# Patient Record
Sex: FEMALE | Race: WHITE | NOT HISPANIC OR LATINO | Employment: OTHER | URBAN - METROPOLITAN AREA
[De-identification: names, ages, dates, MRNs, and addresses within clinical notes are randomized per-mention and may not be internally consistent; named-entity substitution may affect disease eponyms.]

---

## 2019-05-01 ENCOUNTER — HOSPITAL ENCOUNTER (OUTPATIENT)
Dept: ORTHOPEDIC SURGERY | Facility: CLINIC | Age: 69
Discharge: HOME | End: 2019-05-01
Attending: ORTHOPAEDIC SURGERY | Admitting: ORTHOPAEDIC SURGERY

## 2019-09-19 ENCOUNTER — OFFICE VISIT (OUTPATIENT)
Dept: ORTHOPEDIC SURGERY | Facility: CLINIC | Age: 69
End: 2019-09-19

## 2019-09-19 VITALS
DIASTOLIC BLOOD PRESSURE: 73 MMHG | WEIGHT: 284 LBS | SYSTOLIC BLOOD PRESSURE: 140 MMHG | HEART RATE: 96 BPM | BODY MASS INDEX: 48.49 KG/M2 | HEIGHT: 64 IN

## 2019-09-19 DIAGNOSIS — M17.11 PRIMARY LOCALIZED OSTEOARTHRITIS OF RIGHT KNEE: Primary | ICD-10-CM

## 2019-09-19 PROCEDURE — 99213 OFFICE O/P EST LOW 20 MIN: CPT | Performed by: ORTHOPAEDIC SURGERY

## 2019-09-19 PROCEDURE — 20610 DRAIN/INJ JOINT/BURSA W/O US: CPT | Performed by: ORTHOPAEDIC SURGERY

## 2019-09-19 RX ORDER — DULOXETIN HYDROCHLORIDE 60 MG/1
CAPSULE, DELAYED RELEASE ORAL
COMMUNITY
Start: 2019-08-09

## 2019-09-19 RX ORDER — ATORVASTATIN CALCIUM 20 MG/1
20 TABLET, FILM COATED ORAL DAILY
Refills: 0 | COMMUNITY
Start: 2019-06-29

## 2019-09-19 RX ORDER — TRIAMCINOLONE ACETONIDE 40 MG/ML
80 INJECTION, SUSPENSION INTRA-ARTICULAR; INTRAMUSCULAR ONCE
Status: COMPLETED | OUTPATIENT
Start: 2019-09-19 | End: 2019-09-19

## 2019-09-19 RX ORDER — CETIRIZINE HYDROCHLORIDE 10 MG/1
10 TABLET ORAL
COMMUNITY
Start: 2016-04-18

## 2019-09-19 RX ORDER — MELOXICAM 15 MG/1
15 TABLET ORAL DAILY
Refills: 0 | COMMUNITY
Start: 2019-07-22 | End: 2020-09-24

## 2019-09-19 RX ORDER — BENZONATATE 100 MG/1
100 CAPSULE ORAL
COMMUNITY
Start: 2019-08-29 | End: 2020-09-24

## 2019-09-19 RX ORDER — ALBUTEROL SULFATE 90 UG/1
2 AEROSOL, METERED RESPIRATORY (INHALATION)
COMMUNITY
Start: 2019-08-29

## 2019-09-19 RX ORDER — FLUTICASONE PROPIONATE 50 MCG
SPRAY, SUSPENSION (ML) NASAL
COMMUNITY
Start: 2017-11-12

## 2019-09-19 RX ORDER — CELECOXIB 200 MG/1
CAPSULE ORAL
COMMUNITY
Start: 2019-05-01

## 2019-09-19 RX ORDER — HYDROCODONE BITARTRATE AND ACETAMINOPHEN 5; 325 MG/1; MG/1
TABLET ORAL
COMMUNITY
Start: 2017-03-20

## 2019-09-19 RX ORDER — LISINOPRIL AND HYDROCHLOROTHIAZIDE 25; 20 MG/1; MG/1
1 TABLET ORAL DAILY
COMMUNITY
Start: 2018-07-06

## 2019-09-19 RX ORDER — GABAPENTIN 300 MG/1
CAPSULE ORAL 3 TIMES DAILY
Refills: 5 | COMMUNITY
Start: 2019-09-17

## 2019-09-19 RX ORDER — AMITRIPTYLINE HYDROCHLORIDE 25 MG/1
TABLET, FILM COATED ORAL
COMMUNITY
Start: 2019-05-01 | End: 2021-11-18

## 2019-09-19 RX ADMIN — TRIAMCINOLONE ACETONIDE 80 MG: 40 INJECTION, SUSPENSION INTRA-ARTICULAR; INTRAMUSCULAR at 08:42

## 2019-09-19 NOTE — PROGRESS NOTES
"     Patient ID: Desire Short is a 68 y.o. female.  Bilateral knee pain  Had good results with cortisone injection in the left knee in May.  Pain is returned to that knee and the right knee is also sore.  It is sharp and a 7/10    Review of Systems:  Bilateral knee pain  Denies chest pain      Objective:    /73   Pulse 96   Ht 162.6 cm (64\")   Wt 129 kg (284 lb)   BMI 48.75 kg/m²     Physical Examination:   She is a pleasant female in no distress. She is alert and oriented x3 and appears her stated age.  Both knees demonstrate no scars and no atrophy.  There is medial joint line tenderness with mild effusion.  Range of motion is 5 to 110 degrees bilateral.Sensory and motor exam are intact all distributions. Dorsalis pedis and posterior tibialis pulses are palpable and capillary refill is less than two seconds to all digits      Imaging:   Bilateral knee degenerative joint disease    Assessment:    Bilateral knee degenerative joint disease    Plan:  Treatment options discussed  I recommend injection after today's eval.  Risks and benefits were discussed. Under sterile technique and written consent I injected 40mg of Kenalog and 1cc of 1% Lidocaine plain into each knee. It was well tolerated. Postinjection instructions were given.  "

## 2020-05-13 ENCOUNTER — OFFICE VISIT (OUTPATIENT)
Dept: ORTHOPEDIC SURGERY | Facility: CLINIC | Age: 70
End: 2020-05-13

## 2020-05-13 VITALS
WEIGHT: 291 LBS | HEIGHT: 64 IN | DIASTOLIC BLOOD PRESSURE: 83 MMHG | TEMPERATURE: 98.4 F | HEART RATE: 93 BPM | SYSTOLIC BLOOD PRESSURE: 140 MMHG | BODY MASS INDEX: 49.68 KG/M2

## 2020-05-13 DIAGNOSIS — M17.0 PRIMARY OSTEOARTHRITIS OF BOTH KNEES: Primary | ICD-10-CM

## 2020-05-13 PROCEDURE — 99213 OFFICE O/P EST LOW 20 MIN: CPT | Performed by: ORTHOPAEDIC SURGERY

## 2020-05-13 PROCEDURE — 20610 DRAIN/INJ JOINT/BURSA W/O US: CPT | Performed by: ORTHOPAEDIC SURGERY

## 2020-05-13 RX ORDER — CYCLOBENZAPRINE HCL 10 MG
TABLET ORAL
COMMUNITY
Start: 2020-04-02 | End: 2021-11-18

## 2020-05-13 RX ORDER — CITALOPRAM 40 MG/1
40 TABLET ORAL DAILY
COMMUNITY
Start: 2020-03-18

## 2020-05-13 NOTE — PROGRESS NOTES
"     Patient ID: Desire Short is a 69 y.o. female.  Bilateral knee pain  Desire is a 69-year-old female with bilateral knee pain, she had cortisone injections in September with great results but they have returned in both knees.  Pain is sharp and a 6/10    Review of Systems:  Bilateral knee pain  Denies chest pain      Objective:    /83   Pulse 93   Temp 98.4 °F (36.9 °C) (Oral)   Ht 162.6 cm (64\")   Wt 132 kg (291 lb)   BMI 49.95 kg/m²     Physical Examination:     She is a pleasant female in no distress. She is alert and oriented x3 and appears her stated age.  Both knees demonstrate no scars and no atrophy.  There is medial joint line tenderness with mild effusion.  Range of motion is 5 to 110 degrees bilateral.Sensory and motor exam are intact all distributions. Dorsalis pedis and posterior tibialis pulses are palpable and capillary refill is less than two seconds to all digits    Imaging:       Assessment:    Bilateral knee degenerative joint disease    Plan:  Treatment options discussed and I recommend the right after today's evaluation    Large Joint Arthrocentesis: R knee  Date/Time: 5/13/2020 10:41 AM  Timeout: Immediately prior to procedure a time out was called to verify the correct patient, procedure, equipment, support staff and site/side marked as required   Supporting Documentation  Indications: pain   Procedure Details  Location: knee - R knee  Needle size: 22 G  Medications administered: 32 mg Triamcinolone Acetonide 32 MG  Patient tolerance: patient tolerated the procedure well with no immediate complications    Large Joint Arthrocentesis: L knee  Date/Time: 5/13/2020 10:41 AM  Timeout: Immediately prior to procedure a time out was called to verify the correct patient, procedure, equipment, support staff and site/side marked as required   Supporting Documentation  Indications: pain   Procedure Details  Location: knee - L knee  Needle size: 22 G  Medications administered: 32 mg " Triamcinolone Acetonide 32 MG  Patient tolerance: patient tolerated the procedure well with no immediate complications              Disclaimer: Please note that areas of this note were completed with computer voice recognition software.  Quite often unanticipated grammatical, syntax, homophones, and other interpretive errors are inadvertently transcribed by the computer software. Please excuse any errors that have escaped final proofreading.

## 2020-09-08 ENCOUNTER — OFFICE (OUTPATIENT)
Dept: URBAN - METROPOLITAN AREA CLINIC 64 | Facility: CLINIC | Age: 70
End: 2020-09-08
Payer: COMMERCIAL

## 2020-09-08 VITALS
HEIGHT: 64 IN | HEART RATE: 108 BPM | SYSTOLIC BLOOD PRESSURE: 106 MMHG | WEIGHT: 287 LBS | DIASTOLIC BLOOD PRESSURE: 60 MMHG

## 2020-09-08 DIAGNOSIS — D64.9 ANEMIA, UNSPECIFIED: ICD-10-CM

## 2020-09-08 PROCEDURE — 99203 OFFICE O/P NEW LOW 30 MIN: CPT | Performed by: NURSE PRACTITIONER

## 2020-09-15 ENCOUNTER — TELEPHONE (OUTPATIENT)
Dept: ORTHOPEDIC SURGERY | Facility: CLINIC | Age: 70
End: 2020-09-15

## 2020-09-24 ENCOUNTER — OFFICE VISIT (OUTPATIENT)
Dept: ORTHOPEDIC SURGERY | Facility: CLINIC | Age: 70
End: 2020-09-24

## 2020-09-24 VITALS
WEIGHT: 291 LBS | DIASTOLIC BLOOD PRESSURE: 77 MMHG | BODY MASS INDEX: 49.68 KG/M2 | HEIGHT: 64 IN | SYSTOLIC BLOOD PRESSURE: 121 MMHG | HEART RATE: 114 BPM

## 2020-09-24 DIAGNOSIS — M17.0 PRIMARY OSTEOARTHRITIS OF BOTH KNEES: Primary | ICD-10-CM

## 2020-09-24 PROCEDURE — 20610 DRAIN/INJ JOINT/BURSA W/O US: CPT | Performed by: ORTHOPAEDIC SURGERY

## 2020-09-24 NOTE — PROGRESS NOTES
"     Patient ID: Desire Short is a 69 y.o. female.  Bilateral knee pain  Desire is a 61-year-old female with bilateral knee pain here for Visco 1      Objective:    /77   Pulse 114   Ht 162.6 cm (64\")   Wt 132 kg (291 lb)   BMI 49.95 kg/m²     Physical Examination:  Both knees demonstrate no redness mild effusions      Imaging:      Assessment:  Bilateral knee degenerative joint disease    Plan:  Risks and benefits of the injection were discussed. Under sterile technique and written consent I injected one syringe of Orthovisc into each knee. It was well tolerated. Postinjection instructions were given  "

## 2020-10-01 ENCOUNTER — OFFICE VISIT (OUTPATIENT)
Dept: ORTHOPEDIC SURGERY | Facility: CLINIC | Age: 70
End: 2020-10-01

## 2020-10-01 VITALS
HEART RATE: 112 BPM | WEIGHT: 291 LBS | BODY MASS INDEX: 49.68 KG/M2 | SYSTOLIC BLOOD PRESSURE: 105 MMHG | HEIGHT: 64 IN | DIASTOLIC BLOOD PRESSURE: 76 MMHG

## 2020-10-01 DIAGNOSIS — M17.0 PRIMARY OSTEOARTHRITIS OF BOTH KNEES: Primary | ICD-10-CM

## 2020-10-01 PROCEDURE — 20610 DRAIN/INJ JOINT/BURSA W/O US: CPT | Performed by: ORTHOPAEDIC SURGERY

## 2020-10-01 NOTE — PROGRESS NOTES
Patient ID: Desire Short is a 69 y.o. female.  Bilateral knee pain  Here for Visco 2      Objective:    There were no vitals taken for this visit.    Physical Examination:  Both knees demonstrate no redness trace effusions      Imaging:      Assessment:  Bilateral knee degenerative joint disease    Plan:  Risks and benefits of the injection were discussed. Under sterile technique and written consent I injected one syringe of Orthovisc into each knee. It was well tolerated. Postinjection instructions were given

## 2020-10-08 ENCOUNTER — OFFICE VISIT (OUTPATIENT)
Dept: ORTHOPEDIC SURGERY | Facility: CLINIC | Age: 70
End: 2020-10-08

## 2020-10-08 VITALS
WEIGHT: 291 LBS | SYSTOLIC BLOOD PRESSURE: 135 MMHG | BODY MASS INDEX: 49.68 KG/M2 | HEIGHT: 64 IN | DIASTOLIC BLOOD PRESSURE: 85 MMHG | HEART RATE: 101 BPM

## 2020-10-08 DIAGNOSIS — M17.0 PRIMARY OSTEOARTHRITIS OF BOTH KNEES: Primary | ICD-10-CM

## 2020-10-08 PROCEDURE — 20610 DRAIN/INJ JOINT/BURSA W/O US: CPT | Performed by: ORTHOPAEDIC SURGERY

## 2020-10-08 NOTE — PROGRESS NOTES
"     Patient ID: Desier Short is a 69 y.o. female.    Bilateral knee pain, here for Visco 3  Objective:    /85   Pulse 101   Ht 162.6 cm (64\")   Wt 132 kg (291 lb)   BMI 49.95 kg/m²     Physical Examination:    Both knees demonstrate were no redness trace effusions    Imaging:      Assessment:  Bilateral knee degenerative joint disease    Plan:  Risks and benefits of the injection were discussed. Under sterile technique and written consent I injected one syringe of Orthovisc into each knee. It was well tolerated. Postinjection instructions were given  "

## 2021-03-30 ENCOUNTER — TELEPHONE (OUTPATIENT)
Dept: ORTHOPEDIC SURGERY | Facility: CLINIC | Age: 71
End: 2021-03-30

## 2021-03-30 NOTE — TELEPHONE ENCOUNTER
Caller: DENNISE NICOLAS     Relationship: PATIENT     Best call back number: [phone]    What is the best time to reach you: ANYTIME     What was the call regarding: PT IS REQUESTING TO  SWITCH TO CORTIZONE INJECTIONS-PLEASE CALL PATIENT BACK TO DISCUSS-     Do you require a callback: YES

## 2021-04-01 ENCOUNTER — OFFICE VISIT (OUTPATIENT)
Dept: ORTHOPEDIC SURGERY | Facility: CLINIC | Age: 71
End: 2021-04-01

## 2021-04-01 VITALS
SYSTOLIC BLOOD PRESSURE: 150 MMHG | HEIGHT: 64 IN | WEIGHT: 293 LBS | BODY MASS INDEX: 50.02 KG/M2 | DIASTOLIC BLOOD PRESSURE: 85 MMHG | HEART RATE: 94 BPM

## 2021-04-01 DIAGNOSIS — M17.11 PRIMARY LOCALIZED OSTEOARTHRITIS OF RIGHT KNEE: Primary | ICD-10-CM

## 2021-04-01 PROCEDURE — 20610 DRAIN/INJ JOINT/BURSA W/O US: CPT | Performed by: ORTHOPAEDIC SURGERY

## 2021-04-01 PROCEDURE — 99213 OFFICE O/P EST LOW 20 MIN: CPT | Performed by: ORTHOPAEDIC SURGERY

## 2021-04-01 NOTE — PROGRESS NOTES
"     Patient ID: Desire Short is a 70 y.o. female.  Bilateral knee pain  Desire is a 70-year-old female with bilateral knee pain secondary to degenerative disease.  She had previous viscosupplementation about 6 months ago with really little to no relief.  She had good results with previous steroid injection.  Pain is returned in both knees little bit worse on the right than the left.  She is taken some oral medication used ice without relief, pain is sharp and a 6/10    Review of Systems:  Bilateral knee pain      Objective:    /85   Pulse 94   Ht 162.6 cm (64\")   Wt 134 kg (295 lb)   BMI 50.64 kg/m²     Physical Examination:     Right knee demonstrates intact skin with a mild effusion.  No redness.  She has medial lateral joint line tenderness range of motion is 0-1 and 15 degrees with no instability  Sensory and motor exam are intact all distributions. Dorsalis pedis and posterior tibialis pulses are palpable and capillary refill is less than two seconds to all digits    Imaging:       Assessment:    Bilateral knee degenerative disease    Plan:   Treatment options discussed.  I recommend Zilretta in the right knee after today's evaluation        Large Joint Arthrocentesis: R knee  Date/Time: 4/1/2021 9:22 AM  Consent given by: patient  Timeout: Immediately prior to procedure a time out was called to verify the correct patient, procedure, equipment, support staff and site/side marked as required   Supporting Documentation  Indications: pain   Procedure Details  Location: knee - R knee  Preparation: Patient was prepped and draped in the usual sterile fashion  Needle size: 22 G  Medications administered: 32 mg Triamcinolone Acetonide 32 MG  Patient tolerance: patient tolerated the procedure well with no immediate complications                Disclaimer: Please note that areas of this note were completed with computer voice recognition software.  Quite often unanticipated grammatical, syntax, homophones, " and other interpretive errors are inadvertently transcribed by the computer software. Please excuse any errors that have escaped final proofreading.

## 2021-04-15 ENCOUNTER — OFFICE VISIT (OUTPATIENT)
Dept: ORTHOPEDIC SURGERY | Facility: CLINIC | Age: 71
End: 2021-04-15

## 2021-04-15 VITALS
HEIGHT: 64 IN | WEIGHT: 293 LBS | HEART RATE: 90 BPM | SYSTOLIC BLOOD PRESSURE: 142 MMHG | DIASTOLIC BLOOD PRESSURE: 85 MMHG | BODY MASS INDEX: 50.02 KG/M2

## 2021-04-15 DIAGNOSIS — M17.12 PRIMARY LOCALIZED OSTEOARTHROSIS OF THE KNEE, LEFT: Primary | ICD-10-CM

## 2021-04-15 PROCEDURE — 20610 DRAIN/INJ JOINT/BURSA W/O US: CPT | Performed by: ORTHOPAEDIC SURGERY

## 2021-04-15 NOTE — PROGRESS NOTES
"     Patient ID: Desire Short is a 70 y.o. female.  Left knee pain  Desire is a 70-year-old female with left knee pain secondary to degenerative joint disease.  She had viscosupplementation Zilretta injections last year, the right offer her the best relief      Objective:    /85   Pulse 90   Ht 162.6 cm (64\")   Wt 134 kg (295 lb)   BMI 50.64 kg/m²     Physical Examination:    Left knee demonstrates no redness mild effusion    Imaging:      Assessment:  Left knee degenerative joint disease    Plan:      Large Joint Arthrocentesis: L knee  Date/Time: 4/15/2021 9:19 AM  Consent given by: patient  Timeout: Immediately prior to procedure a time out was called to verify the correct patient, procedure, equipment, support staff and site/side marked as required   Supporting Documentation  Indications: pain   Procedure Details  Location: knee - L knee  Preparation: Patient was prepped and draped in the usual sterile fashion  Needle size: 22 G  Medications administered: 32 mg Triamcinolone Acetonide 32 MG  Patient tolerance: patient tolerated the procedure well with no immediate complications        "

## 2021-11-08 ENCOUNTER — TELEPHONE (OUTPATIENT)
Dept: ORTHOPEDIC SURGERY | Facility: CLINIC | Age: 71
End: 2021-11-08

## 2021-11-08 NOTE — TELEPHONE ENCOUNTER
Caller: Desire Short    Relationship to patient: Self    Best call back number: [phone]    Chief complaint: LEFT KNEE PAIN    Type of visit: INJECTION    Requested date: 11/19/21

## 2021-11-18 ENCOUNTER — OFFICE VISIT (OUTPATIENT)
Dept: ORTHOPEDIC SURGERY | Facility: CLINIC | Age: 71
End: 2021-11-18

## 2021-11-18 VITALS
DIASTOLIC BLOOD PRESSURE: 73 MMHG | HEART RATE: 85 BPM | BODY MASS INDEX: 50.02 KG/M2 | HEIGHT: 64 IN | SYSTOLIC BLOOD PRESSURE: 131 MMHG | WEIGHT: 293 LBS

## 2021-11-18 DIAGNOSIS — M17.12 PRIMARY LOCALIZED OSTEOARTHROSIS OF THE KNEE, LEFT: Primary | ICD-10-CM

## 2021-11-18 PROCEDURE — 20610 DRAIN/INJ JOINT/BURSA W/O US: CPT | Performed by: ORTHOPAEDIC SURGERY

## 2021-11-18 RX ORDER — GABAPENTIN 300 MG/1
900 CAPSULE ORAL
COMMUNITY
Start: 2021-11-05 | End: 2022-02-03

## 2021-11-18 RX ORDER — MELOXICAM 15 MG/1
1 TABLET ORAL DAILY PRN
COMMUNITY
Start: 2021-09-17 | End: 2022-09-17

## 2021-11-18 RX ORDER — HYDROCODONE BITARTRATE AND ACETAMINOPHEN 5; 325 MG/1; MG/1
1 TABLET ORAL
COMMUNITY
Start: 2021-11-13 | End: 2021-12-13

## 2021-11-18 RX ORDER — ROPINIROLE 0.5 MG/1
0.5 TABLET, FILM COATED ORAL 2 TIMES DAILY
COMMUNITY
Start: 2021-11-07 | End: 2023-04-03

## 2021-11-18 RX ORDER — MELOXICAM 15 MG/1
TABLET ORAL
COMMUNITY
Start: 2021-11-16 | End: 2023-04-03

## 2021-11-18 RX ORDER — ROPINIROLE 0.5 MG/1
0.5 TABLET, FILM COATED ORAL
COMMUNITY
Start: 2021-11-05 | End: 2023-04-03

## 2021-11-18 NOTE — PROGRESS NOTES
"     Patient ID: Desire Short is a 71 y.o. female.  Left knee pain  Did well with Zilretta in April but started to wear off this month    Objective:    /73   Pulse 85   Ht 162.6 cm (64\")   Wt 134 kg (295 lb)   BMI 50.64 kg/m²     Physical Examination:    Left knee demonstrates mild effusion no redness    Imaging:      Assessment:  Left knee degenerative joint disease    Plan:  I recommend Zilretta after today's evaluation    Large Joint Arthrocentesis: L knee  Date/Time: 11/18/2021 9:54 AM  Consent given by: patient  Timeout: Immediately prior to procedure a time out was called to verify the correct patient, procedure, equipment, support staff and site/side marked as required   Supporting Documentation  Indications: pain   Procedure Details  Location: knee - L knee  Preparation: Patient was prepped and draped in the usual sterile fashion  Needle size: 22 G  Medications administered: 32 mg Triamcinolone Acetonide 32 MG  Patient tolerance: patient tolerated the procedure well with no immediate complications        "

## 2022-05-13 ENCOUNTER — TELEPHONE (OUTPATIENT)
Dept: ORTHOPEDIC SURGERY | Facility: CLINIC | Age: 72
End: 2022-05-13

## 2022-05-13 NOTE — TELEPHONE ENCOUNTER
Caller: DENNISE     Relationship to patient: SELF    Best call back number: [phone]    Chief complaint: LEFT KNEE    Type of visit: INJECTIONS    Requested date: UNKNOWN

## 2022-05-26 ENCOUNTER — OFFICE VISIT (OUTPATIENT)
Dept: ORTHOPEDIC SURGERY | Facility: CLINIC | Age: 72
End: 2022-05-26

## 2022-05-26 VITALS — HEIGHT: 64 IN | WEIGHT: 293 LBS | BODY MASS INDEX: 50.02 KG/M2 | TEMPERATURE: 98.1 F

## 2022-05-26 DIAGNOSIS — M17.12 PRIMARY LOCALIZED OSTEOARTHROSIS OF THE KNEE, LEFT: Primary | ICD-10-CM

## 2022-05-26 PROCEDURE — 99212 OFFICE O/P EST SF 10 MIN: CPT | Performed by: ORTHOPAEDIC SURGERY

## 2022-05-26 RX ORDER — LANOLIN ALCOHOL/MO/W.PET/CERES
CREAM (GRAM) TOPICAL
COMMUNITY
Start: 2022-05-11

## 2022-05-26 RX ORDER — CYCLOBENZAPRINE HCL 10 MG
TABLET ORAL
COMMUNITY
Start: 2022-04-01

## 2022-05-26 NOTE — PROGRESS NOTES
"     Patient ID: Desire Short is a 71 y.o. female.  Left knee pain  Returns with left knee pain did well with Zilretta twice last year most recently November    Review of Systems:    Left knee pain    Objective:    Temp 98.1 °F (36.7 °C)   Ht 162.6 cm (64\")   Wt 134 kg (295 lb)   BMI 50.64 kg/m²     Physical Examination:     Left knee demonstrates moderate medial joint line pain range of motion 2 to 105 degrees negative Alyssa    Imaging:       Assessment:    Left knee degenerative disease    Plan:   I recommend Zilretta I will see her back when approved      Procedures          Disclaimer: Part of this note may be an electronic transcription/translation of spoken language to printed text using the Dragon Dictation System  "

## 2022-06-22 ENCOUNTER — TELEPHONE (OUTPATIENT)
Dept: ORTHOPEDIC SURGERY | Facility: CLINIC | Age: 72
End: 2022-06-22

## 2022-06-22 NOTE — TELEPHONE ENCOUNTER
Provider: DR. ESCOBEDO    Caller: PATIENT    Relationship to Patient: PATIENT    Phone Number:  [phone]    Reason for Call:  PT. LAST SEEN 05/26/22 FOR LEFT KNEE.   STATES THAT SHE THOUGHT SHE WAS GOING TO BE SET UP JUST FOR A CORTISONE INJECTION- BUT HAS NEVER HEARD BACK ON THIS.  SHE STATES THAT THE CORTISONE INJECTIONS WORK WELL FOR HER.   SHE WOULD PREFER TO GET THIS- NOT GEL INJECTIONS.   PT. ASKING IF MICHELLE OR ASSISTANT WILL CALL HER TO ADVISE.     ALSO- PT. SAID TO LET OFFICE KNOW THAT SHE STILL USES [facility] FOR PRESCRIPTIONS.

## 2022-07-12 ENCOUNTER — OFFICE VISIT (OUTPATIENT)
Dept: ORTHOPEDIC SURGERY | Facility: CLINIC | Age: 72
End: 2022-07-12

## 2022-07-12 VITALS
HEART RATE: 84 BPM | DIASTOLIC BLOOD PRESSURE: 72 MMHG | BODY MASS INDEX: 49.34 KG/M2 | WEIGHT: 289 LBS | HEIGHT: 64 IN | SYSTOLIC BLOOD PRESSURE: 119 MMHG

## 2022-07-12 DIAGNOSIS — M17.12 PRIMARY LOCALIZED OSTEOARTHROSIS OF THE KNEE, LEFT: Primary | ICD-10-CM

## 2022-07-12 PROCEDURE — 20610 DRAIN/INJ JOINT/BURSA W/O US: CPT | Performed by: ORTHOPAEDIC SURGERY

## 2022-07-12 RX ORDER — ROPINIROLE 0.5 MG/1
.5-1 TABLET, FILM COATED ORAL 3 TIMES DAILY
COMMUNITY
Start: 2022-05-23 | End: 2023-04-03

## 2022-07-12 RX ORDER — CYCLOBENZAPRINE HCL 10 MG
10 TABLET ORAL
COMMUNITY
Start: 2022-05-23

## 2022-07-12 RX ORDER — LANOLIN ALCOHOL/MO/W.PET/CERES
325 CREAM (GRAM) TOPICAL DAILY
COMMUNITY
Start: 2022-05-23 | End: 2023-04-03

## 2022-07-12 NOTE — PROGRESS NOTES
"     Patient ID: Desire Short is a 71 y.o. female.    Left knee pain here for Zilretta    Objective:    /72   Pulse 84   Ht 162.6 cm (64\")   Wt 131 kg (289 lb)   BMI 49.61 kg/m²     Physical Examination:    Left knee demonstrates mild effusion no redness    Imaging:      Assessment:  Left knee degenerative joint disease    Plan:  Large Joint Arthrocentesis: L knee  Date/Time: 7/12/2022 9:06 AM  Consent given by: patient  Timeout: Immediately prior to procedure a time out was called to verify the correct patient, procedure, equipment, support staff and site/side marked as required   Supporting Documentation  Indications: pain   Procedure Details  Location: knee - L knee  Preparation: Patient was prepped and draped in the usual sterile fashion  Needle size: 22 G  Medications administered: 32 mg Triamcinolone Acetonide 32 MG  Patient tolerance: patient tolerated the procedure well with no immediate complications        "

## 2022-09-23 ENCOUNTER — HOSPITAL ENCOUNTER (EMERGENCY)
Facility: HOSPITAL | Age: 72
Discharge: HOME | End: 2022-09-24
Attending: EMERGENCY MEDICINE | Admitting: EMERGENCY MEDICINE

## 2022-09-23 DIAGNOSIS — D50.9 IRON DEFICIENCY ANEMIA, UNSPECIFIED IRON DEFICIENCY ANEMIA TYPE: Primary | ICD-10-CM

## 2022-09-23 DIAGNOSIS — R19.5 GUAIAC POSITIVE STOOLS: ICD-10-CM

## 2022-09-23 DIAGNOSIS — K92.1 MELENA: ICD-10-CM

## 2022-09-23 LAB
ABO GROUP BLD: NORMAL
ALBUMIN SERPL-MCNC: 3.9 G/DL (ref 3.5–5.2)
ALBUMIN/GLOB SERPL: 1.3 G/DL
ALP SERPL-CCNC: 129 U/L (ref 39–117)
ALT SERPL W P-5'-P-CCNC: 13 U/L (ref 1–33)
ANION GAP SERPL CALCULATED.3IONS-SCNC: 11 MMOL/L (ref 5–15)
AST SERPL-CCNC: 13 U/L (ref 1–32)
BACTERIA UR QL AUTO: ABNORMAL /HPF
BASOPHILS # BLD AUTO: 0 10*3/MM3 (ref 0–0.2)
BASOPHILS NFR BLD AUTO: 0.5 % (ref 0–1.5)
BILIRUB SERPL-MCNC: 0.6 MG/DL (ref 0–1.2)
BILIRUB UR QL STRIP: NEGATIVE
BLD GP AB SCN SERPL QL: NEGATIVE
BUN SERPL-MCNC: 24 MG/DL (ref 8–23)
BUN/CREAT SERPL: 26.7 (ref 7–25)
CALCIUM SPEC-SCNC: 9.5 MG/DL (ref 8.6–10.5)
CHLORIDE SERPL-SCNC: 101 MMOL/L (ref 98–107)
CLARITY UR: CLEAR
CO2 SERPL-SCNC: 27 MMOL/L (ref 22–29)
COLOR UR: YELLOW
CREAT SERPL-MCNC: 0.9 MG/DL (ref 0.57–1)
D-LACTATE SERPL-SCNC: 0.9 MMOL/L (ref 0.5–2)
DEPRECATED RDW RBC AUTO: 49.4 FL (ref 37–54)
EGFRCR SERPLBLD CKD-EPI 2021: 68.9 ML/MIN/1.73
EOSINOPHIL # BLD AUTO: 0.3 10*3/MM3 (ref 0–0.4)
EOSINOPHIL NFR BLD AUTO: 5.7 % (ref 0.3–6.2)
ERYTHROCYTE [DISTWIDTH] IN BLOOD BY AUTOMATED COUNT: 18.3 % (ref 12.3–15.4)
GLOBULIN UR ELPH-MCNC: 2.9 GM/DL
GLUCOSE SERPL-MCNC: 102 MG/DL (ref 65–99)
GLUCOSE UR STRIP-MCNC: NEGATIVE MG/DL
HCT VFR BLD AUTO: 23.7 % (ref 34–46.6)
HGB BLD-MCNC: 7.3 G/DL (ref 12–15.9)
HGB UR QL STRIP.AUTO: NEGATIVE
HYALINE CASTS UR QL AUTO: ABNORMAL /LPF
INR PPP: 0.98 (ref 0.93–1.1)
KETONES UR QL STRIP: NEGATIVE
LEUKOCYTE ESTERASE UR QL STRIP.AUTO: ABNORMAL
LYMPHOCYTES # BLD AUTO: 1.8 10*3/MM3 (ref 0.7–3.1)
LYMPHOCYTES NFR BLD AUTO: 32.5 % (ref 19.6–45.3)
MCH RBC QN AUTO: 23.8 PG (ref 26.6–33)
MCHC RBC AUTO-ENTMCNC: 31 G/DL (ref 31.5–35.7)
MCV RBC AUTO: 76.8 FL (ref 79–97)
MONOCYTES # BLD AUTO: 1 10*3/MM3 (ref 0.1–0.9)
MONOCYTES NFR BLD AUTO: 17.7 % (ref 5–12)
NEUTROPHILS NFR BLD AUTO: 2.4 10*3/MM3 (ref 1.7–7)
NEUTROPHILS NFR BLD AUTO: 43.6 % (ref 42.7–76)
NITRITE UR QL STRIP: NEGATIVE
NRBC BLD AUTO-RTO: 0 /100 WBC (ref 0–0.2)
PH UR STRIP.AUTO: 6.5 [PH] (ref 5–8)
PLATELET # BLD AUTO: 366 10*3/MM3 (ref 140–450)
PMV BLD AUTO: 6.7 FL (ref 6–12)
POTASSIUM SERPL-SCNC: 4.3 MMOL/L (ref 3.5–5.2)
PROT SERPL-MCNC: 6.8 G/DL (ref 6–8.5)
PROT UR QL STRIP: NEGATIVE
PROTHROMBIN TIME: 10.1 SECONDS (ref 9.6–11.7)
RBC # BLD AUTO: 3.09 10*6/MM3 (ref 3.77–5.28)
RBC # UR STRIP: ABNORMAL /HPF
REF LAB TEST METHOD: ABNORMAL
RETICS # AUTO: 0.06 10*6/MM3 (ref 0.02–0.13)
RETICS/RBC NFR AUTO: 1.89 % (ref 0.7–1.9)
RH BLD: POSITIVE
SODIUM SERPL-SCNC: 139 MMOL/L (ref 136–145)
SP GR UR STRIP: 1.01 (ref 1–1.03)
SQUAMOUS #/AREA URNS HPF: ABNORMAL /HPF
T&S EXPIRATION DATE: NORMAL
UROBILINOGEN UR QL STRIP: ABNORMAL
WBC # UR STRIP: ABNORMAL /HPF
WBC NRBC COR # BLD: 5.5 10*3/MM3 (ref 3.4–10.8)

## 2022-09-23 PROCEDURE — 96375 TX/PRO/DX INJ NEW DRUG ADDON: CPT

## 2022-09-23 PROCEDURE — 86901 BLOOD TYPING SEROLOGIC RH(D): CPT

## 2022-09-23 PROCEDURE — 25010000002 DIPHENHYDRAMINE PER 50 MG: Performed by: EMERGENCY MEDICINE

## 2022-09-23 PROCEDURE — 85025 COMPLETE CBC W/AUTO DIFF WBC: CPT | Performed by: PHYSICIAN ASSISTANT

## 2022-09-23 PROCEDURE — 86900 BLOOD TYPING SEROLOGIC ABO: CPT | Performed by: PHYSICIAN ASSISTANT

## 2022-09-23 PROCEDURE — 85045 AUTOMATED RETICULOCYTE COUNT: CPT | Performed by: PHYSICIAN ASSISTANT

## 2022-09-23 PROCEDURE — 86923 COMPATIBILITY TEST ELECTRIC: CPT

## 2022-09-23 PROCEDURE — 83540 ASSAY OF IRON: CPT | Performed by: PHYSICIAN ASSISTANT

## 2022-09-23 PROCEDURE — 86901 BLOOD TYPING SEROLOGIC RH(D): CPT | Performed by: PHYSICIAN ASSISTANT

## 2022-09-23 PROCEDURE — 99284 EMERGENCY DEPT VISIT MOD MDM: CPT

## 2022-09-23 PROCEDURE — 85610 PROTHROMBIN TIME: CPT | Performed by: PHYSICIAN ASSISTANT

## 2022-09-23 PROCEDURE — 84466 ASSAY OF TRANSFERRIN: CPT | Performed by: PHYSICIAN ASSISTANT

## 2022-09-23 PROCEDURE — 80053 COMPREHEN METABOLIC PANEL: CPT | Performed by: PHYSICIAN ASSISTANT

## 2022-09-23 PROCEDURE — 86850 RBC ANTIBODY SCREEN: CPT | Performed by: PHYSICIAN ASSISTANT

## 2022-09-23 PROCEDURE — 86900 BLOOD TYPING SEROLOGIC ABO: CPT

## 2022-09-23 PROCEDURE — 81001 URINALYSIS AUTO W/SCOPE: CPT | Performed by: PHYSICIAN ASSISTANT

## 2022-09-23 PROCEDURE — 83605 ASSAY OF LACTIC ACID: CPT

## 2022-09-23 PROCEDURE — 96374 THER/PROPH/DIAG INJ IV PUSH: CPT

## 2022-09-23 RX ORDER — ACETAMINOPHEN 500 MG
1000 TABLET ORAL ONCE
Status: COMPLETED | OUTPATIENT
Start: 2022-09-23 | End: 2022-09-23

## 2022-09-23 RX ORDER — PANTOPRAZOLE SODIUM 40 MG/10ML
80 INJECTION, POWDER, LYOPHILIZED, FOR SOLUTION INTRAVENOUS ONCE
Status: COMPLETED | OUTPATIENT
Start: 2022-09-23 | End: 2022-09-23

## 2022-09-23 RX ORDER — SODIUM CHLORIDE 0.9 % (FLUSH) 0.9 %
10 SYRINGE (ML) INJECTION AS NEEDED
Status: DISCONTINUED | OUTPATIENT
Start: 2022-09-23 | End: 2022-09-24 | Stop reason: HOSPADM

## 2022-09-23 RX ORDER — DIPHENHYDRAMINE HYDROCHLORIDE 50 MG/ML
25 INJECTION INTRAMUSCULAR; INTRAVENOUS ONCE
Status: COMPLETED | OUTPATIENT
Start: 2022-09-23 | End: 2022-09-23

## 2022-09-23 RX ADMIN — DIPHENHYDRAMINE HYDROCHLORIDE 25 MG: 50 INJECTION, SOLUTION INTRAMUSCULAR; INTRAVENOUS at 23:51

## 2022-09-23 RX ADMIN — ACETAMINOPHEN 1000 MG: 500 TABLET ORAL at 23:50

## 2022-09-23 RX ADMIN — PANTOPRAZOLE SODIUM 80 MG: 40 INJECTION, POWDER, FOR SOLUTION INTRAVENOUS at 23:05

## 2022-09-23 NOTE — ED PROVIDER NOTES
Subjective       Patient is a 71-year-old female comes in complaining of abnormal lab yesterday on outpatient labs with her primary care doctor on 3-month routine checkup.  Patient states she was told her hemoglobin was 7.4.  Patient reports history of iron deficiency.  Patient states she has felt somewhat more fatigued than usual but states  this is mild for her.  Patient denies any near syncope, loss of consciousness, lightheadedness, chest pain, shortness of breath, nausea, vomiting, diarrhea, abdominal pain or blood or black tarry stools.  Patient states she takes Mobic for chronic pain but denies any other blood thinner.      Review of Systems   Constitutional: Positive for fatigue. Negative for chills and fever.   HENT: Negative for congestion, sore throat, tinnitus and trouble swallowing.    Eyes: Negative for photophobia, discharge and visual disturbance.   Respiratory: Negative for cough, shortness of breath and wheezing.    Cardiovascular: Negative for chest pain, palpitations and leg swelling.   Gastrointestinal: Negative for abdominal pain, blood in stool, diarrhea, nausea and vomiting.   Genitourinary: Negative for dysuria, flank pain and urgency.   Musculoskeletal: Negative for arthralgias and myalgias.   Skin: Negative for rash.   Neurological: Negative for dizziness and headaches.   Psychiatric/Behavioral: Negative for confusion.       Past Medical History:   Diagnosis Date   • Asthma    • Hypertension        No Known Allergies    History reviewed. No pertinent surgical history.    Family History   Problem Relation Age of Onset   • Cancer Father        Social History     Socioeconomic History   • Marital status:    Tobacco Use   • Smoking status: Never Smoker   • Smokeless tobacco: Never Used   Vaping Use   • Vaping Use: Never used   Substance and Sexual Activity   • Alcohol use: No   • Drug use: Defer   • Sexual activity: Defer           Objective   Physical Exam  Vitals and nursing note  reviewed. Exam conducted with a chaperone present (Tamy RUTH janusz).   Constitutional:       General: She is not in acute distress.     Appearance: She is well-developed. She is not diaphoretic.   HENT:      Head: Normocephalic and atraumatic.      Right Ear: External ear normal.      Left Ear: External ear normal.      Nose: Nose normal.      Mouth/Throat:      Pharynx: No oropharyngeal exudate.   Eyes:      Extraocular Movements: Extraocular movements intact.      Conjunctiva/sclera: Conjunctivae normal.      Pupils: Pupils are equal, round, and reactive to light.   Cardiovascular:      Rate and Rhythm: Normal rate and regular rhythm.      Pulses: Normal pulses.      Heart sounds: Normal heart sounds.      Comments: S1, S2 audible.  Pulmonary:      Effort: Pulmonary effort is normal. No respiratory distress.      Breath sounds: Normal breath sounds. No wheezing, rhonchi or rales.      Comments: On room air.  Abdominal:      General: Bowel sounds are normal. There is no distension.      Palpations: Abdomen is soft.      Tenderness: There is no abdominal tenderness. There is no guarding or rebound.   Genitourinary:     Rectum: Normal. Guaiac result positive.      Comments: Tamy boudreaux at bedside during rectal exam for chaperone.  Patient had no gross bright red blood on visual inspection and no erythema of the rectum.  Patient had melanotic stool and he had a positive Hemoccult here.  No bright red blood present.  Musculoskeletal:         General: No tenderness or deformity. Normal range of motion.      Cervical back: Normal range of motion.   Skin:     General: Skin is warm.      Capillary Refill: Capillary refill takes less than 2 seconds.      Findings: No erythema or rash.   Neurological:      General: No focal deficit present.      Mental Status: She is alert and oriented to person, place, and time.      Cranial Nerves: No cranial nerve deficit.      Sensory: No sensory deficit.      Motor: No weakness.  "  Psychiatric:         Mood and Affect: Mood normal.         Behavior: Behavior normal.         Procedures           ED Course  ED Course as of 09/24/22 0552   Sat Sep 24, 2022   0012 Repeat H and H within 3 days [RL]      ED Course User Index  [RL] Francisco Bueno PA      /61 (BP Location: Left arm, Patient Position: Sitting)   Pulse 74   Temp 98.5 °F (36.9 °C) (Oral)   Resp 19   Ht 162.6 cm (64\")   Wt 130 kg (287 lb)   SpO2 98%   BMI 49.26 kg/m²   Labs Reviewed   COMPREHENSIVE METABOLIC PANEL - Abnormal; Notable for the following components:       Result Value    Glucose 102 (*)     BUN 24 (*)     Alkaline Phosphatase 129 (*)     BUN/Creatinine Ratio 26.7 (*)     All other components within normal limits    Narrative:     GFR Normal >60  Chronic Kidney Disease <60  Kidney Failure <15     URINALYSIS W/ CULTURE IF INDICATED - Abnormal; Notable for the following components:    Leuk Esterase, UA Moderate (2+) (*)     All other components within normal limits    Narrative:     In absence of clinical symptoms, the presence of pyuria, bacteria, and/or nitrites on the urinalysis result does not correlate with infection.   CBC WITH AUTO DIFFERENTIAL - Abnormal; Notable for the following components:    RBC 3.09 (*)     Hemoglobin 7.3 (*)     Hematocrit 23.7 (*)     MCV 76.8 (*)     MCH 23.8 (*)     MCHC 31.0 (*)     RDW 18.3 (*)     Monocyte % 17.7 (*)     Monocytes, Absolute 1.00 (*)     All other components within normal limits   URINALYSIS, MICROSCOPIC ONLY - Abnormal; Notable for the following components:    RBC, UA 0-2 (*)     WBC, UA 3-5 (*)     Bacteria, UA Trace (*)     All other components within normal limits   IRON PROFILE - Abnormal; Notable for the following components:    Iron 20 (*)     Iron Saturation 4 (*)     All other components within normal limits   PROTIME-INR - Normal   RETICULOCYTES - Normal   POC LACTATE - Normal   POC LACTATE   TYPE AND SCREEN   PREPARE RBC   CBC AND DIFFERENTIAL    " "Narrative:     The following orders were created for panel order CBC & Differential.  Procedure                               Abnormality         Status                     ---------                               -----------         ------                     CBC Auto Differential[[id number]]        Abnormal            Final result                 Please view results for these tests on the individual orders.     No radiology results for the last day                                       MDM     Chart review:  NKA  EKG: Not indicated    Imaging: Not indicated  Labs: Hemoglobin 7.3 with low MCV and MCH.  CMP largely unremarkable for acute findings.  PT and INR unremarkable.  Patient typed and screened.  UA large unremarkable for acute findings.  Iron profile was ordered and low iron saturation and iron and normal TIBC.  Reticulocytes normal but in the upper range of normal at 1.9.  Initial lactic normal at 0.9.  Vitals:  /61 (BP Location: Left arm, Patient Position: Sitting)   Pulse 74   Temp 98.5 °F (36.9 °C) (Oral)   Resp 19   Ht 162.6 cm (64\")   Wt 130 kg (287 lb)   SpO2 98%   BMI 49.26 kg/m²     Medications given:    Medications   pantoprazole (PROTONIX) injection 80 mg (80 mg Intravenous Given 9/23/22 2305)   acetaminophen (TYLENOL) tablet 1,000 mg (1,000 mg Oral Given 9/23/22 2350)   diphenhydrAMINE (BENADRYL) injection 25 mg (25 mg Intravenous Given 9/23/22 2351)       Procedures:  Not indicated  MDM: Patient is a 71-year-old female comes in complaining of abnormal lab and low hemoglobin.   IV was established. Hemoglobin 7.3 with low MCV and MCH.  CMP largely unremarkable for acute findings.  PT and INR unremarkable.  Patient typed and screened.  UA large unremarkable for acute findings.  Iron profile was ordered and low iron saturation and iron and normal TIBC.  Reticulocytes normal but in the upper range of normal at 1.9.  Initial lactic normal at 0.9.  Patient given 1 unit PRBCs.  Patient was " given Tylenol and Benadryl prior to giving PRBCs.  Patient was given IV Protonix as patient did have positive Hemoccult here in the ED.  Patient instructed to follow-up with GI regarding this.  Patient also instructed to have repeat H&H within 3 days and this was listed on discharge instructions.  Patient family at bedside given strict return precautions and voiced understanding. See full discharge instructions for further details.  Results and plan discussed with patient and is agreeable with plan.  Case discussed with attending provider, Dr. Wilder above.    Final diagnoses:   Iron deficiency anemia, unspecified iron deficiency anemia type   Guaiac positive stools   Melena       ED Disposition  ED Disposition     ED Disposition   Discharge    Condition   Stable    Comment   --             [facility] EMERGENCY DEPARTMENT  [address] [facility] [address] [phone]  Go in 1 day  As needed, If symptoms worsen    Emelina Lopez L, APRN  [address]  [place] [id number] [phone]    Schedule an appointment as soon as possible for a visit in 1 week  For follow-up lab work    Damian Jonas MD  [address] [facility] [place] [address] [phone]    Schedule an appointment as soon as possible for a visit in 1 week  for GI follow up         Medication List      New Prescriptions    pantoprazole 40 MG EC tablet  Commonly known as: PROTONIX  Take 1 tablet by mouth Daily for 14 days.           Where to Get Your Medications      These medications were sent to [id number] - [place], [place] - [address] - [phone]  - [phone] FX  [address] [place] [address]    Phone: [phone]   · pantoprazole 40 MG EC tablet          Francisco Bueno PA  09/24/22 0532       Francisco Bueno PA  09/24/22 0529       Francisco Bueno PA  09/24/22 6562

## 2022-09-24 VITALS
WEIGHT: 287 LBS | HEIGHT: 64 IN | DIASTOLIC BLOOD PRESSURE: 61 MMHG | BODY MASS INDEX: 49 KG/M2 | TEMPERATURE: 98.5 F | SYSTOLIC BLOOD PRESSURE: 128 MMHG | HEART RATE: 74 BPM | RESPIRATION RATE: 19 BRPM | OXYGEN SATURATION: 98 %

## 2022-09-24 LAB
IRON 24H UR-MRATE: 20 MCG/DL (ref 37–145)
IRON SATN MFR SERPL: 4 % (ref 20–50)
TIBC SERPL-MCNC: 533 MCG/DL (ref 298–536)
TRANSFERRIN SERPL-MCNC: 358 MG/DL (ref 200–360)

## 2022-09-24 PROCEDURE — P9016 RBC LEUKOCYTES REDUCED: HCPCS

## 2022-09-24 PROCEDURE — 86900 BLOOD TYPING SEROLOGIC ABO: CPT

## 2022-09-24 PROCEDURE — 36430 TRANSFUSION BLD/BLD COMPNT: CPT

## 2022-09-24 RX ORDER — PANTOPRAZOLE SODIUM 40 MG/1
40 TABLET, DELAYED RELEASE ORAL DAILY
Qty: 14 TABLET | Refills: 0 | Status: SHIPPED | OUTPATIENT
Start: 2022-09-24 | End: 2022-10-08

## 2022-09-24 NOTE — ED NOTES
Pt. Ambulatory to restroom before starting blood, states she can't wait entire transfusion. Blood start time will be off because of this. Scanned at 030 but started at 038.

## 2022-09-24 NOTE — ED NOTES
Friend at bedside. Pt. Aware she is waiting blood transfusion and then will be d'c home to f/u on Monday with PMD

## 2022-09-24 NOTE — ED NOTES
"Pt. States she had routine labs drawn today at her PMD which she gets done approx every 6 months. States that her labs were at 9:30 and got a phone call around 6:00pm stating that she needed to go to ER for low HGB and possible transfusions. Denies any obvious blood loss in urine/stool/emisis. States she normally has a hgb that is 'borderline low\" around 9 she states but takes iron. States stools are typically black b/c of iron but has not noticed any obvious bleeding/blood.  States she has been a little more fatigued than normal and states she thought that was from her fibromyalgia.  "

## 2022-09-24 NOTE — DISCHARGE INSTRUCTIONS
Please come back to the ER if you feel short of breath or feel like you are going to pass out or have severe chest pain you will need reevaluation that time.  Please follow-up with Dr. Jonas as he had microscopic blood in your stool and have history of anemia by giving them a call as soon as possible to set up an appointment in 1 to 2 weeks.  Please follow-up with her primary care provider for lab recheck in 3 days time.  Please continue to take your iron supplement as previously prescribed can take your iron supplement twice daily.  Please do not take NSAIDs, (ie celebrex and mobic) until follow up with Primary care provider.  Please take Protonix as prescribed.

## 2022-09-25 LAB
BH BB BLOOD EXPIRATION DATE: NORMAL
BH BB BLOOD TYPE BARCODE: 6200
BH BB DISPENSE STATUS: NORMAL
BH BB PRODUCT CODE: NORMAL
BH BB UNIT NUMBER: NORMAL
CROSSMATCH INTERPRETATION: NORMAL
UNIT  ABO: NORMAL
UNIT  RH: NORMAL

## 2022-12-05 ENCOUNTER — OFFICE (OUTPATIENT)
Dept: URBAN - METROPOLITAN AREA PATHOLOGY 4 | Facility: PATHOLOGY | Age: 72
End: 2022-12-05
Payer: COMMERCIAL

## 2022-12-05 ENCOUNTER — OFFICE (OUTPATIENT)
Dept: URBAN - METROPOLITAN AREA PATHOLOGY 4 | Facility: PATHOLOGY | Age: 72
End: 2022-12-05
Payer: MEDICARE

## 2022-12-05 ENCOUNTER — ON CAMPUS - OUTPATIENT (OUTPATIENT)
Dept: URBAN - METROPOLITAN AREA HOSPITAL 2 | Facility: HOSPITAL | Age: 72
End: 2022-12-05
Payer: MEDICARE

## 2022-12-05 VITALS
SYSTOLIC BLOOD PRESSURE: 93 MMHG | DIASTOLIC BLOOD PRESSURE: 68 MMHG | OXYGEN SATURATION: 98 % | DIASTOLIC BLOOD PRESSURE: 42 MMHG | DIASTOLIC BLOOD PRESSURE: 66 MMHG | OXYGEN SATURATION: 96 % | WEIGHT: 277 LBS | DIASTOLIC BLOOD PRESSURE: 70 MMHG | RESPIRATION RATE: 19 BRPM | DIASTOLIC BLOOD PRESSURE: 64 MMHG | OXYGEN SATURATION: 99 % | OXYGEN SATURATION: 100 % | SYSTOLIC BLOOD PRESSURE: 95 MMHG | TEMPERATURE: 97.7 F | SYSTOLIC BLOOD PRESSURE: 153 MMHG | HEART RATE: 95 BPM | RESPIRATION RATE: 16 BRPM | HEART RATE: 83 BPM | DIASTOLIC BLOOD PRESSURE: 65 MMHG | SYSTOLIC BLOOD PRESSURE: 127 MMHG | RESPIRATION RATE: 14 BRPM | SYSTOLIC BLOOD PRESSURE: 97 MMHG | HEART RATE: 85 BPM | DIASTOLIC BLOOD PRESSURE: 62 MMHG | HEART RATE: 88 BPM | HEIGHT: 64 IN | HEART RATE: 89 BPM | SYSTOLIC BLOOD PRESSURE: 125 MMHG | SYSTOLIC BLOOD PRESSURE: 116 MMHG | RESPIRATION RATE: 18 BRPM | SYSTOLIC BLOOD PRESSURE: 109 MMHG

## 2022-12-05 DIAGNOSIS — K25.9 GASTRIC ULCER, UNSPECIFIED AS ACUTE OR CHRONIC, WITHOUT HEMO: ICD-10-CM

## 2022-12-05 DIAGNOSIS — K29.50 UNSPECIFIED CHRONIC GASTRITIS WITHOUT BLEEDING: ICD-10-CM

## 2022-12-05 DIAGNOSIS — D12.2 BENIGN NEOPLASM OF ASCENDING COLON: ICD-10-CM

## 2022-12-05 DIAGNOSIS — K44.9 DIAPHRAGMATIC HERNIA WITHOUT OBSTRUCTION OR GANGRENE: ICD-10-CM

## 2022-12-05 DIAGNOSIS — D50.9 IRON DEFICIENCY ANEMIA, UNSPECIFIED: ICD-10-CM

## 2022-12-05 DIAGNOSIS — K57.30 DIVERTICULOSIS OF LARGE INTESTINE WITHOUT PERFORATION OR ABS: ICD-10-CM

## 2022-12-05 PROBLEM — K63.5 POLYP OF COLON: Status: ACTIVE | Noted: 2022-12-05

## 2022-12-05 LAB
GI HISTOLOGY: A. UNSPECIFIED: (no result)
GI HISTOLOGY: B. SELECT: (no result)
GI HISTOLOGY: C. UNSPECIFIED: (no result)
GI HISTOLOGY: PDF REPORT: (no result)

## 2022-12-05 PROCEDURE — 45385 COLONOSCOPY W/LESION REMOVAL: CPT | Performed by: INTERNAL MEDICINE

## 2022-12-05 PROCEDURE — 88305 TISSUE EXAM BY PATHOLOGIST: CPT | Mod: 26 | Performed by: INTERNAL MEDICINE

## 2022-12-05 PROCEDURE — 88342 IMHCHEM/IMCYTCHM 1ST ANTB: CPT | Mod: 26 | Performed by: INTERNAL MEDICINE

## 2022-12-05 PROCEDURE — 43239 EGD BIOPSY SINGLE/MULTIPLE: CPT | Performed by: INTERNAL MEDICINE

## 2022-12-05 NOTE — SERVICEHPINOTES
Had ODALYS req blood tx a few weeks ago, f/u hgb improved.    AZAM VELASQUEZ  is a  72  female   who presents today for a  EGD-Colonoscopy   for   the indications listed below. The updated Patient Profile was reviewed prior to the procedure, in conjunction with the Physical Exam, including medical conditions, surgical procedures, medications, allergies, family history and social history. See Physical Exam time stamp below for date and time of HPI completion.Pre-operatively, I reviewed the indication(s) for the procedure, the risks of the procedure [including but not limited to: unexpected bleeding possibly requiring hospitalization and/or unplanned repeat procedures, perforation possibly requiring surgical treatment, missed lesions and complications of sedation/MAC (also explained by anesthesia staff)]. I have evaluated the patient for risks associated with the planned anesthesia and the procedure to be performed and find the patient an acceptable candidate for IV sedation.Multiple opportunities were provided for any questions or concerns, and all questions were answered satisfactorily before any anesthesia was administered. We will proceed with the planned procedure.br

## 2022-12-05 NOTE — SERVICENOTES
Ozzie's erosions likely cause of pt's ODALYS. Could consider HH repair but comorbidities/obesity would increase her risk.

## 2023-03-13 ENCOUNTER — TRANSCRIBE ORDERS (OUTPATIENT)
Dept: ADMINISTRATIVE | Facility: HOSPITAL | Age: 73
End: 2023-03-13
Payer: MEDICARE

## 2023-03-13 DIAGNOSIS — M54.16 LUMBAR RADICULOPATHY: Primary | ICD-10-CM

## 2023-03-21 ENCOUNTER — HOSPITAL ENCOUNTER (OUTPATIENT)
Dept: MRI IMAGING | Facility: HOSPITAL | Age: 73
Discharge: HOME | End: 2023-03-21
Admitting: NURSE PRACTITIONER
Payer: MEDICARE

## 2023-03-21 DIAGNOSIS — M54.16 LUMBAR RADICULOPATHY: ICD-10-CM

## 2023-03-21 PROCEDURE — 72148 MRI LUMBAR SPINE W/O DYE: CPT

## 2023-04-03 ENCOUNTER — OFFICE VISIT (OUTPATIENT)
Dept: PAIN MEDICINE | Facility: CLINIC | Age: 73
End: 2023-04-03
Payer: MEDICARE

## 2023-04-03 ENCOUNTER — PATIENT ROUNDING (BHMG ONLY) (OUTPATIENT)
Dept: PAIN MEDICINE | Facility: CLINIC | Age: 73
End: 2023-04-03
Payer: MEDICARE

## 2023-04-03 VITALS
HEART RATE: 78 BPM | RESPIRATION RATE: 16 BRPM | OXYGEN SATURATION: 99 % | DIASTOLIC BLOOD PRESSURE: 70 MMHG | SYSTOLIC BLOOD PRESSURE: 140 MMHG

## 2023-04-03 DIAGNOSIS — M51.36 DDD (DEGENERATIVE DISC DISEASE), LUMBAR: ICD-10-CM

## 2023-04-03 DIAGNOSIS — M48.061 SPINAL STENOSIS AT L4-L5 LEVEL: Primary | ICD-10-CM

## 2023-04-03 DIAGNOSIS — M54.16 LUMBAR RADICULOPATHY: ICD-10-CM

## 2023-04-03 PROCEDURE — 99204 OFFICE O/P NEW MOD 45 MIN: CPT | Performed by: STUDENT IN AN ORGANIZED HEALTH CARE EDUCATION/TRAINING PROGRAM

## 2023-04-03 PROCEDURE — 1125F AMNT PAIN NOTED PAIN PRSNT: CPT | Performed by: STUDENT IN AN ORGANIZED HEALTH CARE EDUCATION/TRAINING PROGRAM

## 2023-04-03 RX ORDER — PREGABALIN 150 MG/1
150 CAPSULE ORAL 2 TIMES DAILY
Qty: 30 CAPSULE | Refills: 0 | Status: SHIPPED | OUTPATIENT
Start: 2023-04-03

## 2023-04-03 NOTE — PROGRESS NOTES
CHIEF COMPLAINT  Chief Complaint   Patient presents with   • Back Pain     New patient referred for Lumbar arthropathy Treated w/Hydrocodone 04/02 @ 9 pm        Primary Care  Emelina Lopez APRN Subjective   Desire Short is a 72 y.o. female  who presents for chronic low back pain with bilateral lumbar radiculopathy.  She reports pain in the lower lumbar region which tends to radiate into the hips bilaterally.  She does have some focal tenderness across the SI joints bilaterally as well.  She describes pain especially with prolonged standing and walking which improves with leaning forward and resting.  She tends to have minimal pain with sitting and resting.  She is currently taking hydrocodone from her primary care provider but does not get significant pain relief with the medications.  She recently had an MRI which does show several areas of mild to moderate stenosis which could be contributing to her pain.  She denies any significant red flag symptoms such as loss of bowel or bladder or significant numbness or weakness.    History of Present Illness     Location: Low back with radiation to the hips bilaterally  Onset: Years ago  Duration: Slowly worsening  Timing: Constant throughout the day  Quality: Aching, cramping  Severity: Today: 3       Last Week: 3       Worst: 7  Modifying Factors: The pain is worse with movement and physical activity and standing and slightly improved with sitting  Functional Deficit: The pain makes it difficult for her to perform her activities of daily living and stand for prolonged periods of time    Physical Therapy: no    Interval Update 04/03/2023:     The following portions of the patient's history were reviewed and updated as appropriate: allergies, current medications, past family history, past medical history, past social history, past surgical history and problem list.      Current Outpatient Medications:   •  albuterol sulfate  (90 Base) MCG/ACT inhaler,  Inhale 2 puffs., Disp: , Rfl:   •  atorvastatin (LIPITOR) 20 MG tablet, Take 1 tablet by mouth Daily. 200001, Disp: , Rfl: 0  •  Breo Ellipta 200-25 MCG/INH inhaler, Inhale 1 puff Daily., Disp: , Rfl:   •  celecoxib (CeleBREX) 200 MG capsule, CELEBREX 200 MG CAPS, Disp: , Rfl:   •  cetirizine (zyrTEC) 10 MG tablet, Take 1 tablet by mouth., Disp: , Rfl:   •  Cholecalciferol 1000 units tablet, Take 2 tablets by mouth Daily., Disp: , Rfl:   •  citalopram (CeleXA) 40 MG tablet, Take 1 tablet by mouth Daily. 200001, Disp: , Rfl:   •  cyclobenzaprine (FLEXERIL) 10 MG tablet, TAKE 1 TABLET BY MOUTH THREE TIMES A DAY AS NEEDED FOR MUSCLE SPASM (FLEXERIL), Disp: , Rfl:   •  cyclobenzaprine (FLEXERIL) 10 MG tablet, Take 1 tablet by mouth., Disp: , Rfl:   •  DULoxetine (CYMBALTA) 60 MG capsule, TAKE 1 CAPSULE BY MOUTH ONE TIME A DAY, Disp: , Rfl:   •  ferrous sulfate 325 (65 FE) MG EC tablet, Take 1 tablet by mouth daily with breakfast Take on empty stomach, with something acidic., Disp: , Rfl:   •  fluticasone (FLONASE) 50 MCG/ACT nasal spray, , Disp: , Rfl:   •  gabapentin (NEURONTIN) 300 MG capsule, Take  by mouth 3 (Three) Times a Day., Disp: , Rfl: 5  •  HYDROcodone-acetaminophen (NORCO) 5-325 MG per tablet, , Disp: , Rfl:   •  lisinopril-hydrochlorothiazide (PRINZIDE,ZESTORETIC) 20-25 MG per tablet, Take 1 tablet by mouth Daily., Disp: , Rfl:   •  PROAIR  (90 Base) MCG/ACT inhaler, , Disp: , Rfl:   •  pregabalin (LYRICA) 150 MG capsule, Take 1 capsule by mouth 2 (Two) Times a Day., Disp: 30 capsule, Rfl: 0    Current Facility-Administered Medications:   •  Hyaluronan (ORTHOVISC) injection 30 mg, 30 mg, Intra-articular, Weekly, Kaleb Teague MD, 30 mg at 09/24/20 1057  •  Hyaluronan (ORTHOVISC) injection 30 mg, 30 mg, Intra-articular, Weekly, Kaleb Teague MD, 30 mg at 09/24/20 1057  •  Hyaluronan (ORTHOVISC) injection 30 mg, 30 mg, Intra-articular, Weekly, Kaleb Teague MD, 30 mg at  10/01/20 1052  •  Hyaluronan (ORTHOVISC) injection 30 mg, 30 mg, Intra-articular, Weekly, Kaleb Teague MD, 30 mg at 10/01/20 1051  •  Hyaluronan (ORTHOVISC) injection 30 mg, 30 mg, Intra-articular, Weekly, Kaleb Teague MD, 30 mg at 10/08/20 1106  •  Hyaluronan (ORTHOVISC) injection 30 mg, 30 mg, Intra-articular, Weekly, Kaleb Teague MD, 30 mg at 10/08/20 1105    Review of Systems   Musculoskeletal: Positive for arthralgias, back pain, gait problem and myalgias. Negative for joint swelling.   Neurological: Negative for weakness and numbness.       Vitals:    04/03/23 0906   BP: 140/70   Pulse: 78   Resp: 16   SpO2: 99%   PainSc:   3         Objective   Physical Exam  Vitals and nursing note reviewed.   Constitutional:       Appearance: She is well-developed.   Neck:      Trachea: No tracheal deviation.   Musculoskeletal:      Comments: Lumbar Spine Exam:  Tender to palpation over the lumbar paraspinal musculature No  Limited range of motion secondary to pain No  Facet loading positive: Negative  Facets tender to palpation: Negative  Straight leg raise test positive: Equivocal    SI Joint Exam:  David positive: Negative  PSIS tender: bilateral   SI joint palpation: bilateral  SI joint compression: Negative     Neurological:      General: No focal deficit present.      Mental Status: She is alert.      Sensory: No sensory deficit.      Motor: No weakness.           Assessment & Plan   Problems Addressed this Visit    None  Visit Diagnoses     Spinal stenosis at L4-L5 level    -  Primary    Relevant Medications    pregabalin (LYRICA) 150 MG capsule    Other Relevant Orders    Epidural Block    Lumbar radiculopathy        DDD (degenerative disc disease), lumbar          Diagnoses       Codes Comments    Spinal stenosis at L4-L5 level    -  Primary ICD-10-CM: M48.061  ICD-9-CM: 724.02     Lumbar radiculopathy     ICD-10-CM: M54.16  ICD-9-CM: 724.4     DDD (degenerative disc disease),  lumbar     ICD-10-CM: M51.36  ICD-9-CM: 722.52           Plan:  1. I reviewed her lumbar MRI which does show several areas of mild to moderate stenosis.  Given that she has pain radiating into the hips especially with prolonged standing and walking and improvement with flexion suggests more of a stenotic type presentation  2. We will plan for lumbar epidural steroid injection at L4-5  3. She had previously been on gabapentin.  We will try Lyrica 150 mg twice daily to see if she does somewhat better  4. She does have some SI tenderness on palpation.  May consider SI joints in the future if she does not get significant benefit from LESI   --- Follow-up next available for lumbar epidural steroid injection           INSPECT REPORT    As part of the patient's treatment plan, I may be prescribing controlled substances. The patient has been made aware of appropriate use of such medications, including potential risk of somnolence, limited ability to drive and/or work safely, and the potential for dependence or overdose. It has also bee made clear that these medications are for use by this patient only, without concomitant use of alcohol or other substances unless prescribed.     Patient has completed prescribing agreement detailing terms of continued prescribing of controlled substances, including monitoring AMA reports, urine drug screening, and pill counts if necessary. The patient is aware that inappropriate use will results in cessation of prescribing such medications.    INSPECT report has been reviewed and scanned into the patient's chart.    As the clinician, I personally reviewed the INSPECT from 3/31/2023.    History and physical exam exhibit continued safe and appropriate use of controlled substances.      EMR Dragon/Transcription disclaimer:   Much of this encounter note is an electronic transcription/translation of spoken language to printed text. The electronic translation of spoken language may permit  erroneous, or at times, nonsensical words or phrases to be inadvertently transcribed; Although I have reviewed the note for such errors, some may still exist.

## 2023-04-03 NOTE — PROGRESS NOTES
April 3, 2023    Hello, may I speak with Desire Short?    My name is Lora     I am  with [facility]  [address]  [place] [address].    Before we get started may I verify your date of birth? 1950    I am calling to officially welcome you to our practice and ask about your recent visit. Is this a good time to talk? yes    Tell me about your visit with us. What things went well?  Very satisfied        We're always looking for ways to make our patients' experiences even better. Do you have recommendations on ways we may improve?  no    Overall were you satisfied with your first visit to our practice? yes       I appreciate you taking the time to speak with me today. Is there anything else I can do for you? no      Thank you, and have a great day.

## 2023-04-12 ENCOUNTER — HOSPITAL ENCOUNTER (OUTPATIENT)
Dept: PAIN MEDICINE | Facility: HOSPITAL | Age: 73
Discharge: HOME | End: 2023-04-12
Payer: MEDICARE

## 2023-04-12 VITALS
TEMPERATURE: 98.3 F | HEIGHT: 64 IN | SYSTOLIC BLOOD PRESSURE: 143 MMHG | WEIGHT: 280 LBS | RESPIRATION RATE: 16 BRPM | OXYGEN SATURATION: 100 % | BODY MASS INDEX: 47.8 KG/M2 | DIASTOLIC BLOOD PRESSURE: 77 MMHG | HEART RATE: 68 BPM

## 2023-04-12 DIAGNOSIS — M51.36 DDD (DEGENERATIVE DISC DISEASE), LUMBAR: Primary | ICD-10-CM

## 2023-04-12 DIAGNOSIS — M54.16 LUMBAR RADICULOPATHY: ICD-10-CM

## 2023-04-12 DIAGNOSIS — R52 PAIN: ICD-10-CM

## 2023-04-12 DIAGNOSIS — M48.061 SPINAL STENOSIS AT L4-L5 LEVEL: ICD-10-CM

## 2023-04-12 PROCEDURE — 62323 NJX INTERLAMINAR LMBR/SAC: CPT | Performed by: STUDENT IN AN ORGANIZED HEALTH CARE EDUCATION/TRAINING PROGRAM

## 2023-04-12 PROCEDURE — 25510000001 IOPAMIDOL 41 % SOLUTION: Performed by: STUDENT IN AN ORGANIZED HEALTH CARE EDUCATION/TRAINING PROGRAM

## 2023-04-12 PROCEDURE — 77003 FLUOROGUIDE FOR SPINE INJECT: CPT

## 2023-04-12 PROCEDURE — 25010000002 ROPIVACAINE PER 1 MG: Performed by: STUDENT IN AN ORGANIZED HEALTH CARE EDUCATION/TRAINING PROGRAM

## 2023-04-12 PROCEDURE — 25010000002 METHYLPREDNISOLONE PER 40 MG: Performed by: STUDENT IN AN ORGANIZED HEALTH CARE EDUCATION/TRAINING PROGRAM

## 2023-04-12 RX ORDER — ROPIVACAINE HYDROCHLORIDE 2 MG/ML
5 INJECTION, SOLUTION EPIDURAL; INFILTRATION; PERINEURAL ONCE
Status: COMPLETED | OUTPATIENT
Start: 2023-04-12 | End: 2023-04-12

## 2023-04-12 RX ORDER — METHYLPREDNISOLONE ACETATE 40 MG/ML
40 INJECTION, SUSPENSION INTRA-ARTICULAR; INTRALESIONAL; INTRAMUSCULAR; SOFT TISSUE ONCE
Status: COMPLETED | OUTPATIENT
Start: 2023-04-12 | End: 2023-04-12

## 2023-04-12 RX ADMIN — ROPIVACAINE HYDROCHLORIDE 5 MG: 2 INJECTION EPIDURAL; INFILTRATION; PERINEURAL at 09:23

## 2023-04-12 RX ADMIN — IOPAMIDOL 3 ML: 408 INJECTION, SOLUTION INTRATHECAL at 09:22

## 2023-04-12 RX ADMIN — METHYLPREDNISOLONE ACETATE 40 MG: 40 INJECTION, SUSPENSION INTRA-ARTICULAR; INTRALESIONAL; INTRAMUSCULAR; INTRASYNOVIAL; SOFT TISSUE at 09:23

## 2023-04-12 NOTE — PROCEDURES
Lumbar Epidural Steroid Injection  [facility]    PREOPERATIVE DIAGNOSIS:   Chronic low back pain, Lumbar Degenerative Disc Disease and Lumbar Spinal Stenosis unspecified regarding Neurogenic Claudication  POSTOPERATIVE DIAGNOSIS:  Same as preop diagnosis    PROCEDURE:   Lumbar Epidural Steroid Injection, Therapeutic Translaminar Injection, with epidurogram, at  L3/L4 level    PRE-PROCEDURE DISCUSSION WITH PATIENT:    Risks and complications were discussed with the patient prior to starting the procedure and informed consent was obtained.  We discussed various topics including but not limited to bleeding, infection, injury, paralysis, nerve injury, dural puncture, coma, death, worsening of clinical picture, lack of pain relief, and postprocedural soreness.    SURGEON:  Chris Camarena MD    REASON FOR PROCEDURE:    Degenerative changes are noted in the area., Stenotic area is noted, and is likely contributing to this chronic &/or recurrent pain.  and Radiating pattern of pain is likely consistent with degenerative changes in the area.    SEDATION:  Patient declined administration of moderate sedation    ANESTHETIC:  Ropi 0.2%  STEROID:   Methylprednisolone (DEPO MEDROL) 40mg/ml    DESCRIPTON OF PROCEDURE:    After obtaining informed consent, I.V. was not started in the preop area.   The patient was taken to the operating room and placed in the prone position.  All pressure points were well padded.  The lumbar spine area was prepped with Chloraprep and draped in a sterile fashion.  Under fluoroscopic guidance, the above mentioned interlaminar space was identified. Skin and subcutaneous tissues were anesthetized with 1% lidocaine in the middle of the space. A Tuohy needle was introduced through the skin and advanced to this interlaminar space and into the epidural space under fluoroscopic guidance and verified with loss-of-resistance technique to air.  After confirming the position of the needle with the  fluoroscope with all the views, and after aspiration was confirmed negative for blood and CSF, 1.5 mL of Omnipaque was injected.  After seeing appropriate epidurogram with lateral and PA views, a total of 4 cc solution was injected, consisting of 3cc of local anesthetic as above, with normal saline and injectable steroid as above.     ESTIMATED BLOOD LOSS:  <5 mL  SPECIMENS:  None    COMPLICATIONS:     No complications were noted., There was no indication of vascular uptake on live injection of contrast dye., There was no indication of intrathecal uptake on live injection of contrast dye. and There was not any evidence of dural puncture.      TOLERANCE & DISCHARGE CONDITION:    The patient tolerated the procedure well.  The patient was transported to the recovery area without difficulties.  The patient was discharged to home under the care of family in stable and satisfactory condition.    PLAN OF CARE:  1. The patient was given our standard instruction sheet.  2. The patient will Return to clinic 4-6 wks  3. The patient will resume all medications as per the medication reconciliation sheet.

## 2023-04-12 NOTE — DISCHARGE INSTRUCTIONS

## 2023-04-13 ENCOUNTER — TELEPHONE (OUTPATIENT)
Dept: PAIN MEDICINE | Facility: CLINIC | Age: 73
End: 2023-04-13
Payer: MEDICARE

## 2023-04-13 ENCOUNTER — TELEPHONE (OUTPATIENT)
Dept: PAIN MEDICINE | Facility: HOSPITAL | Age: 73
End: 2023-04-13
Payer: MEDICARE

## 2023-04-13 NOTE — TELEPHONE ENCOUNTER
Called PT and scheduled pt for follow up, PT said she was started on Lyrica and was given a prescription for 2 weeks. She is not out of medication and actually did not even get to start taking until last Friday because of an problem at the pharmacy   . Pt was just wondering if she need to call when she was out or what she should do/. Please advise

## 2023-04-24 DIAGNOSIS — M48.061 SPINAL STENOSIS AT L4-L5 LEVEL: ICD-10-CM

## 2023-04-24 RX ORDER — PREGABALIN 150 MG/1
150 CAPSULE ORAL 2 TIMES DAILY
Qty: 60 CAPSULE | Refills: 1 | Status: SHIPPED | OUTPATIENT
Start: 2023-04-24

## 2023-04-24 NOTE — TELEPHONE ENCOUNTER
Rx Refill Note  Requested Prescriptions     Pending Prescriptions Disp Refills   • pregabalin (LYRICA) 150 MG capsule 30 capsule 0     Sig: Take 1 capsule by mouth 2 (Two) Times a Day.      Last office visit with prescribing clinician: 4/3/2023   Last telemedicine visit with prescribing clinician: 5/22/2023   Next office visit with prescribing clinician: 5/22/2023                         Would you like a call back once the refill request has been completed: [] Yes [] No    If the office needs to give you a call back, can they leave a voicemail: [] Yes [] No    Lora Pyle MA  04/24/23, 08:40 EDT

## 2023-05-16 DIAGNOSIS — M48.061 SPINAL STENOSIS AT L4-L5 LEVEL: ICD-10-CM

## 2023-05-16 RX ORDER — PREGABALIN 150 MG/1
150 CAPSULE ORAL 3 TIMES DAILY
Qty: 909 CAPSULE | Refills: 1 | Status: SHIPPED | OUTPATIENT
Start: 2023-05-16

## 2023-05-22 ENCOUNTER — OFFICE VISIT (OUTPATIENT)
Dept: PAIN MEDICINE | Facility: CLINIC | Age: 73
End: 2023-05-22
Payer: MEDICARE

## 2023-05-22 ENCOUNTER — TELEPHONE (OUTPATIENT)
Dept: ORTHOPEDIC SURGERY | Facility: CLINIC | Age: 73
End: 2023-05-22
Payer: MEDICARE

## 2023-05-22 VITALS
HEART RATE: 74 BPM | DIASTOLIC BLOOD PRESSURE: 39 MMHG | SYSTOLIC BLOOD PRESSURE: 144 MMHG | RESPIRATION RATE: 16 BRPM | OXYGEN SATURATION: 98 %

## 2023-05-22 DIAGNOSIS — M17.12 PRIMARY LOCALIZED OSTEOARTHROSIS OF THE KNEE, LEFT: Primary | ICD-10-CM

## 2023-05-22 DIAGNOSIS — M51.36 DDD (DEGENERATIVE DISC DISEASE), LUMBAR: ICD-10-CM

## 2023-05-22 DIAGNOSIS — M48.061 SPINAL STENOSIS AT L4-L5 LEVEL: ICD-10-CM

## 2023-05-22 DIAGNOSIS — M17.12 PRIMARY LOCALIZED OSTEOARTHRITIS OF LEFT KNEE: Primary | ICD-10-CM

## 2023-05-22 NOTE — PROGRESS NOTES
CHIEF COMPLAINT  Chief Complaint   Patient presents with   • Back Pain     6 wk f/u from Lumbar epidural  CC  Spinal stenosis  Treated w/Hydrocodone 05/21 @ 9 pm        Primary Care  Emelina Lopez APRN Subjective   Desire Short is a 72 y.o. female  who presents for chronic low back pain with bilateral lumbar radiculopathy.  She reports pain in the lower lumbar region which tends to radiate into the hips bilaterally.  She does have some focal tenderness across the SI joints bilaterally as well.  She describes pain especially with prolonged standing and walking which improves with leaning forward and resting.  She tends to have minimal pain with sitting and resting.  She is currently taking hydrocodone from her primary care provider but does not get significant pain relief with the medications.  She recently had an MRI which does show several areas of mild to moderate stenosis which could be contributing to her pain.  She denies any significant red flag symptoms such as loss of bowel or bladder or significant numbness or weakness.    Back Pain  Pertinent negatives include no numbness or weakness.        Location: Low back with radiation to the hips bilaterally  Onset: Years ago  Duration: Slowly worsening  Timing: Constant throughout the day  Quality: Aching, cramping  Severity: Today: 3       Last Week: 3       Worst: 7  Modifying Factors: The pain is worse with movement and physical activity and standing and slightly improved with sitting  Functional Deficit: The pain makes it difficult for her to perform her activities of daily living and stand for prolonged periods of time    Physical Therapy: no    Interval Update 05/22/2023: Getting better benefit with the Lyrica 150 mg 3 times daily.  Is requesting repeat knee injections.  She reports she received injections approximately 1 year ago with Zilretta and got approximately 1 year benefit    The following portions of the patient's history were reviewed and  updated as appropriate: allergies, current medications, past family history, past medical history, past social history, past surgical history and problem list.      Current Outpatient Medications:   •  albuterol sulfate  (90 Base) MCG/ACT inhaler, Inhale 2 puffs., Disp: , Rfl:   •  atorvastatin (LIPITOR) 20 MG tablet, Take 1 tablet by mouth Daily. 200001, Disp: , Rfl: 0  •  Breo Ellipta 200-25 MCG/INH inhaler, Inhale 1 puff Daily., Disp: , Rfl:   •  celecoxib (CeleBREX) 200 MG capsule, CELEBREX 200 MG CAPS, Disp: , Rfl:   •  cetirizine (zyrTEC) 10 MG tablet, Take 1 tablet by mouth., Disp: , Rfl:   •  Cholecalciferol 1000 units tablet, Take 2 tablets by mouth Daily., Disp: , Rfl:   •  citalopram (CeleXA) 40 MG tablet, Take 1 tablet by mouth Daily. 200001, Disp: , Rfl:   •  cyclobenzaprine (FLEXERIL) 10 MG tablet, TAKE 1 TABLET BY MOUTH THREE TIMES A DAY AS NEEDED FOR MUSCLE SPASM (FLEXERIL), Disp: , Rfl:   •  cyclobenzaprine (FLEXERIL) 10 MG tablet, Take 1 tablet by mouth., Disp: , Rfl:   •  DULoxetine (CYMBALTA) 60 MG capsule, TAKE 1 CAPSULE BY MOUTH ONE TIME A DAY, Disp: , Rfl:   •  ferrous sulfate 325 (65 FE) MG EC tablet, Take 1 tablet by mouth daily with breakfast Take on empty stomach, with something acidic., Disp: , Rfl:   •  fluticasone (FLONASE) 50 MCG/ACT nasal spray, , Disp: , Rfl:   •  gabapentin (NEURONTIN) 300 MG capsule, Take  by mouth 3 (Three) Times a Day., Disp: , Rfl: 5  •  HYDROcodone-acetaminophen (NORCO) 5-325 MG per tablet, , Disp: , Rfl:   •  lisinopril-hydrochlorothiazide (PRINZIDE,ZESTORETIC) 20-25 MG per tablet, Take 1 tablet by mouth Daily., Disp: , Rfl:   •  pregabalin (LYRICA) 150 MG capsule, Take 1 capsule by mouth 3 (Three) Times a Day., Disp: 909 capsule, Rfl: 1  •  PROAIR  (90 Base) MCG/ACT inhaler, , Disp: , Rfl:     Current Facility-Administered Medications:   •  Hyaluronan (ORTHOVISC) injection 30 mg, 30 mg, Intra-articular, Weekly, Kaleb Teague MD, 30 mg  at 09/24/20 1057  •  Hyaluronan (ORTHOVISC) injection 30 mg, 30 mg, Intra-articular, Weekly, Kaleb Teague MD, 30 mg at 09/24/20 1057  •  Hyaluronan (ORTHOVISC) injection 30 mg, 30 mg, Intra-articular, Weekly, Kaleb Teague MD, 30 mg at 10/01/20 1052  •  Hyaluronan (ORTHOVISC) injection 30 mg, 30 mg, Intra-articular, Weekly, Kaleb Teague MD, 30 mg at 10/01/20 1051  •  Hyaluronan (ORTHOVISC) injection 30 mg, 30 mg, Intra-articular, Weekly, Kaleb Teague MD, 30 mg at 10/08/20 1106  •  Hyaluronan (ORTHOVISC) injection 30 mg, 30 mg, Intra-articular, Weekly, Kaleb Teague MD, 30 mg at 10/08/20 1105    Review of Systems   Musculoskeletal: Positive for arthralgias, back pain, gait problem and myalgias. Negative for joint swelling.   Neurological: Negative for weakness and numbness.       Vitals:    05/22/23 0844   BP: (!) 144/39   Pulse: 74   Resp: 16   SpO2: 98%   PainSc:   3         Objective   Physical Exam  Vitals and nursing note reviewed.   Constitutional:       Appearance: She is well-developed.   Neck:      Trachea: No tracheal deviation.   Musculoskeletal:      Comments: Lumbar Spine Exam:  Tender to palpation over the lumbar paraspinal musculature No  Limited range of motion secondary to pain No  Facet loading positive: Negative  Facets tender to palpation: Negative  Straight leg raise test positive: Equivocal    SI Joint Exam:  David positive: Negative  PSIS tender: bilateral   SI joint palpation: bilateral  SI joint compression: Negative     Neurological:      General: No focal deficit present.      Mental Status: She is alert.      Sensory: No sensory deficit.      Motor: No weakness.           Assessment & Plan   Problems Addressed this Visit    None  Visit Diagnoses     Primary localized osteoarthritis of left knee    -  Primary    Relevant Orders    Large Joint Arthrocentesis    Spinal stenosis at L4-L5 level        DDD (degenerative disc disease), lumbar           Diagnoses       Codes Comments    Primary localized osteoarthritis of left knee    -  Primary ICD-10-CM: M17.12  ICD-9-CM: 715.16     Spinal stenosis at L4-L5 level     ICD-10-CM: M48.061  ICD-9-CM: 724.02     DDD (degenerative disc disease), lumbar     ICD-10-CM: M51.36  ICD-9-CM: 722.52           Plan:  1. I reviewed her lumbar MRI which does show several areas of mild to moderate stenosis.  Given that she has pain radiating into the hips especially with prolonged standing and walking and improvement with flexion suggests more of a stenotic type presentation  2. Limited benefit with LESI  3. Continue gabapentin 150 mg 3 times daily  4. Plan for left knee injection with Zilretta  --- Follow-up next available for left knee injection           INSPECT REPORT    As part of the patient's treatment plan, I may be prescribing controlled substances. The patient has been made aware of appropriate use of such medications, including potential risk of somnolence, limited ability to drive and/or work safely, and the potential for dependence or overdose. It has also bee made clear that these medications are for use by this patient only, without concomitant use of alcohol or other substances unless prescribed.     Patient has completed prescribing agreement detailing terms of continued prescribing of controlled substances, including monitoring AMA reports, urine drug screening, and pill counts if necessary. The patient is aware that inappropriate use will results in cessation of prescribing such medications.    INSPECT report has been reviewed and scanned into the patient's chart.    As the clinician, I personally reviewed the INSPECT from 5/19/2023.    History and physical exam exhibit continued safe and appropriate use of controlled substances.      EMR Dragon/Transcription disclaimer:   Much of this encounter note is an electronic transcription/translation of spoken language to printed text. The electronic  translation of spoken language may permit erroneous, or at times, nonsensical words or phrases to be inadvertently transcribed; Although I have reviewed the note for such errors, some may still exist.

## 2023-05-22 NOTE — TELEPHONE ENCOUNTER
PATIENT CAME INTO THE OFFICE TODAY REQUESTING TO BE SCHEDULED FOR A GEL INJECTION IN HER LEFT KNEE. PLEASE ADVISE

## 2023-06-07 ENCOUNTER — HOSPITAL ENCOUNTER (OUTPATIENT)
Dept: PAIN MEDICINE | Facility: HOSPITAL | Age: 73
Discharge: HOME | End: 2023-06-07
Payer: MEDICARE

## 2023-06-07 VITALS
SYSTOLIC BLOOD PRESSURE: 122 MMHG | HEIGHT: 64 IN | WEIGHT: 280 LBS | RESPIRATION RATE: 16 BRPM | HEART RATE: 76 BPM | BODY MASS INDEX: 47.8 KG/M2 | DIASTOLIC BLOOD PRESSURE: 64 MMHG | TEMPERATURE: 98.1 F | OXYGEN SATURATION: 98 %

## 2023-06-07 DIAGNOSIS — R52 PAIN: ICD-10-CM

## 2023-06-07 DIAGNOSIS — M17.12 PRIMARY LOCALIZED OSTEOARTHRITIS OF LEFT KNEE: Primary | ICD-10-CM

## 2023-06-07 PROCEDURE — 77003 FLUOROGUIDE FOR SPINE INJECT: CPT

## 2023-06-07 PROCEDURE — 25510000001 IOPAMIDOL 41 % SOLUTION: Performed by: STUDENT IN AN ORGANIZED HEALTH CARE EDUCATION/TRAINING PROGRAM

## 2023-06-07 PROCEDURE — 25010000002 TRIAMCINOLONE ACETONIDE 32 MG SUSPENSION RECONSTITUTED ER: Performed by: STUDENT IN AN ORGANIZED HEALTH CARE EDUCATION/TRAINING PROGRAM

## 2023-06-07 RX ADMIN — TRIAMCINOLONE ACETONIDE EXTENDED-RELEASE INJECTABLE SUSPENSION 32 MG: KIT INTRA-ARTICULAR at 08:50

## 2023-06-07 RX ADMIN — IOPAMIDOL 3 ML: 408 INJECTION, SOLUTION INTRATHECAL at 08:50

## 2023-06-07 NOTE — DISCHARGE INSTRUCTIONS
Knee Injection  A knee injection is a procedure to get medicine into your knee joint to relieve the pain, swelling, and stiffness of arthritis. Your health care provider uses a needle to inject medicine, which may also help to lubricate and cushion your knee joint. You may need more than one injection.  Tell a health care provider about:  Any allergies you have.  All medicines you are taking, including vitamins, herbs, eye drops, creams, and over-the-counter medicines.  Any problems you or family members have had with anesthetic medicines.  Any blood disorders you have.  Any surgeries you have had.  Any medical conditions you have.  Whether you are pregnant or may be pregnant.  What are the risks?  Generally, this is a safe procedure. However, problems may occur, including:  Infection.  Bleeding.  Symptoms that get worse.  Damage to the area around your knee.  Allergic reaction to any of the medicines.  Skin reactions from repeated injections.  What happens before the procedure?  Ask your health care provider about:  Changing or stopping your regular medicines. This is especially important if you are taking diabetes medicines or blood thinners.  Taking over-the-counter medicines, vitamins, herbs, and supplements.    What happens during the procedure?    You will sit or lie down in a position for your knee to be treated.  The skin over your kneecap will be cleaned with a germ-killing soap.  You will be given a medicine that numbs the area (local anesthetic). You may feel some stinging.  The medicine will be injected into your knee. The needle is carefully placed between your kneecap and your knee. The medicine is injected into the joint space.  The needle will be removed at the end of the procedure.  A band-aid may be placed over the injection site.  The procedure may vary among health care providers and hospitals.  What can I expect after the procedure?  Your blood pressure, heart rate, breathing rate, and blood  oxygen level will be monitored until you leave the hospital or clinic.  You may have to move your knee through its full range of motion. This helps to get all the medicine into your joint space.  You will be watched to make sure that you do not have a reaction to the injected medicine.  You may feel more pain, swelling, and warmth than you did before the injection. This reaction may last about 1-2 days.  Follow these instructions at home:  Medicines  Take over-the-counter and prescription medicines only as told by your health care provider.  Ask your health care provider if the medicine prescribed to you requires you to avoid driving or using machinery.  Injection site care  Follow these instructions:  Keep your puncture site clean and dry for 24 hours.  After 24 hours, you may remove your band-aid.    Check your injection area every day for signs of infection. Check for:  More redness, swelling, or pain after 2 days.  Fluid or blood.  Pus or a bad smell.  Warmth.  Managing pain, stiffness, and swelling    If directed, put ice on the injection area. To do this:  Put ice in a plastic bag.  Place a towel between your skin and the bag.  Leave the ice on for 20 minutes, 2-3 times per day.  Remove the ice if your skin turns bright red. This is very important. If you cannot feel pain, heat, or cold, you have a greater risk of damage to the area.  Do not apply heat to your knee for 24-48 hours  Raise (elevate) the injection area above the level of your heart while you are sitting or lying down.     General instructions  Avoid strenuous activities for 24 hours. You can return to your normal activities in 24 hours unless otherwise directed.  Keep all follow-up visits. This is important. You may need more injections.  Contact a health care provider if you have:  A fever.  Warmth in your injection area.  Fluid, blood, or pus coming from your injection site.  Symptoms at your injection site that last longer than 2 days after  your procedure.  Get help right away if:  Your knee turns very red.  Your knee becomes very swollen.  Your knee is in severe pain.  Summary  A knee injection is a procedure to get medicine into your knee joint to relieve the pain, swelling, and stiffness of arthritis.  A needle is carefully placed between your kneecap and your knee to inject medicine into the joint space.  Before the procedure, ask your health care provider about changing or stopping your regular medicines, especially if you are taking diabetes medicines or blood thinners.  Contact your health care provider if you have any problems or questions after your procedure.  This information is not intended to replace advice given to you by your health care provider. Make sure you discuss any questions you have with your health care provider.  Document Revised: 06/02/2021 Document Reviewed: 06/02/2021  Elsevier Patient Education © 2021 [employer]

## 2023-06-07 NOTE — PROCEDURES
Left Knee Intraarticular injection under fluoroscopic guidance  [facility]      PREOPERATIVE DIAGNOSIS:  Left knee osteoarthritis    POSTOPERATIVE DIAGNOSIS:  Same as preop diagnosis    PROCEDURE:   Left Knee Intraarticular injection under fluoroscopic guidance      PRE-PROCEDURE DISCUSSION WITH PATIENT:    Risks and complications were discussed with the patient prior to starting the procedure and informed consent was obtained.  We discussed various topics including but not limited to bleeding, infection, injury, nerve injury, paralysis, coma, death, postprocedural painful flare-up, postprocedural site soreness, and a lack of pain relief.        SURGEON:  Chris Camarena MD    REASON FOR PROCEDURE:    Chronic left knee pain, obesity.  Because of this, I felt that fluoroscopic assistance was necessary to confirm that we entered the joint space.      SEDATION:  Patient declined administration of moderate sedation    ANESTHETIC:  NONE  STEROID:   Zilretta 32mg  HYALURONIC PRODUCT:   None*      DESCRIPTON OF PROCEDURE:    After obtaining informed consent, an I.V. was not started in the preoperative area. The patient taken to the operating room and was placed in the supine position.  All pressure points were well padded.  The appropriate area was prepped with Chloraprep and draped in a sterile fashion.     The above indicated solution was prepared and the area over the entirety of the knee was prepared with a wide surgical prep of chlorhexidine solution.    On the LEFT KNEE, a point on the lateral aspect of the joint line was identified to enter into the joint space, and the sterile needle was inserted parallel with a slight posterior angle to enter easily into the joint space.  Aspiration was negative.   Contrast dye was injected and a proper arthrogram was seen.   Then, slowly, the aforementioned solution was injected easily into the joint space.     The needle was removed intact and bleeding was minimal. The  insertion site was dressed with a Band-Aid.        ESTIMATED BLOOD LOSS:  <5 mL  SPECIMENS:  none      COMPLICATIONS:   No complications were noted. and There was no indication of vascular uptake on live injection of contrast dye.      TOLERANCE & DISCHARGE CONDITION:    The patient tolerated the procedure well.  The patient was transported to the recovery area without difficulties.  The patient was discharged to home under the care of family in stable and satisfactory condition.      PLAN OF CARE:  The patient was given our standard instruction sheet.  The patient will Return to clinic 6 wks.  The patient will resume all medications as per the medication reconciliation sheet.

## 2023-06-08 ENCOUNTER — TELEPHONE (OUTPATIENT)
Dept: PAIN MEDICINE | Facility: HOSPITAL | Age: 73
End: 2023-06-08
Payer: MEDICARE

## 2023-06-08 NOTE — TELEPHONE ENCOUNTER
Post procedure phone call completed.  Pt states they are doing good and denies questions or concerns.

## 2023-07-24 ENCOUNTER — HOSPITAL ENCOUNTER (OUTPATIENT)
Dept: PAIN MEDICINE | Facility: HOSPITAL | Age: 73
Discharge: HOME | End: 2023-07-24
Payer: MEDICARE

## 2023-07-24 VITALS
HEIGHT: 64 IN | DIASTOLIC BLOOD PRESSURE: 90 MMHG | WEIGHT: 280 LBS | BODY MASS INDEX: 47.8 KG/M2 | RESPIRATION RATE: 16 BRPM | OXYGEN SATURATION: 92 % | HEART RATE: 93 BPM | TEMPERATURE: 97.1 F | SYSTOLIC BLOOD PRESSURE: 129 MMHG

## 2023-07-24 DIAGNOSIS — R52 PAIN: ICD-10-CM

## 2023-07-24 DIAGNOSIS — M51.36 DDD (DEGENERATIVE DISC DISEASE), LUMBAR: Primary | ICD-10-CM

## 2023-07-24 PROCEDURE — 25510000001 IOPAMIDOL 41 % SOLUTION: Performed by: STUDENT IN AN ORGANIZED HEALTH CARE EDUCATION/TRAINING PROGRAM

## 2023-07-24 PROCEDURE — 77003 FLUOROGUIDE FOR SPINE INJECT: CPT

## 2023-07-24 PROCEDURE — 25010000002 BUPIVACAINE (PF) 0.25 % SOLUTION: Performed by: STUDENT IN AN ORGANIZED HEALTH CARE EDUCATION/TRAINING PROGRAM

## 2023-07-24 PROCEDURE — 64493 INJ PARAVERT F JNT L/S 1 LEV: CPT | Performed by: STUDENT IN AN ORGANIZED HEALTH CARE EDUCATION/TRAINING PROGRAM

## 2023-07-24 PROCEDURE — 25010000002 METHYLPREDNISOLONE PER 40 MG: Performed by: STUDENT IN AN ORGANIZED HEALTH CARE EDUCATION/TRAINING PROGRAM

## 2023-07-24 PROCEDURE — 64494 INJ PARAVERT F JNT L/S 2 LEV: CPT | Performed by: STUDENT IN AN ORGANIZED HEALTH CARE EDUCATION/TRAINING PROGRAM

## 2023-07-24 RX ORDER — METHYLPREDNISOLONE ACETATE 40 MG/ML
40 INJECTION, SUSPENSION INTRA-ARTICULAR; INTRALESIONAL; INTRAMUSCULAR; SOFT TISSUE ONCE
Status: COMPLETED | OUTPATIENT
Start: 2023-07-24 | End: 2023-07-24

## 2023-07-24 RX ORDER — BUPIVACAINE HYDROCHLORIDE 2.5 MG/ML
10 INJECTION, SOLUTION EPIDURAL; INFILTRATION; INTRACAUDAL ONCE
Status: COMPLETED | OUTPATIENT
Start: 2023-07-24 | End: 2023-07-24

## 2023-07-24 RX ADMIN — IOPAMIDOL 3 ML: 408 INJECTION, SOLUTION INTRATHECAL at 14:29

## 2023-07-24 RX ADMIN — METHYLPREDNISOLONE ACETATE 40 MG: 40 INJECTION, SUSPENSION INTRA-ARTICULAR; INTRALESIONAL; INTRAMUSCULAR; INTRASYNOVIAL; SOFT TISSUE at 14:31

## 2023-07-24 RX ADMIN — BUPIVACAINE HYDROCHLORIDE 6 ML: 2.5 INJECTION, SOLUTION EPIDURAL; INFILTRATION; INTRACAUDAL; PERINEURAL at 14:30

## 2023-07-24 NOTE — ADDENDUM NOTE
Encounter addended by: Lynne Choudhary LPN on: 7/24/2023 3:00 PM   Actions taken: Flowsheet accepted

## 2023-07-24 NOTE — PROCEDURES
Bilateral L3-5 Lumbar Medial Branch Blockade  [facility]      PREOPERATIVE DIAGNOSIS:  Lumbar spondylosis without myelopathy    POSTOPERATIVE DIAGNOSIS:  Lumbar spondylosis without myelopathy    PROCEDURE:   Diagnostic Bilateral Lumbar Medial Branch Nerve Blockades, with fluoroscopy:  L3, L4, and L5 nerves (at the L4 & L5 transverse processes and the sacral alar groove) to block facet joints L4-5, and L5-S1  90266-69 -- Bilateral Lumbar Facet blocks, 1st Level  83918-10 -- Bilateral Lumbar Facet blocks, 2nd  Level    PRE-PROCEDURE DISCUSSION WITH PATIENT:    Risks and complications were discussed with the patient prior to starting the procedure and informed consent was obtained.      SURGEON:   Alen Camarena MD    REASON FOR PROCEDURE:    Increased back pain on range of motion exams, Pain on extension of the lumbar spine, and Positive lumbar facet loading maneuver    SEDATION:  Patient declined administration of moderate sedation    ANESTHETIC:  Marcaine 0.25%  STEROID:  Methylprednisolone (DEPO MEDROL) 40mg/ml  TOTAL VOLUME OF SOLUTION:  6ml    DESCRIPTON OF PROCEDURE:  After obtaining informed consent, IV access was not obtained in the preoperative area.   The patient was taken to the operating room.  The patient was placed in the prone position with a pillow under the abdomen. All pressure points were well padded.  The lumbosacral area was prepped with Chloraprep and draped in a sterile fashion. Under fluoroscopic guidance the transverse processes of the L4 and L5 vertebrae at the junctions of the superior articular processes were identified on the right. Also identified was the groove between the ala and the superior articular process of the sacrum on the ipsilateral side.  Skin and subcutaneous tissue were anesthetized with 1% lidocaine above each of these points. A 22-gauge spinal needle was introduced under fluoroscopic guidance at the above junctions. Aspiration was negative for blood and CSF.   After confirming the position of the needle with fluoroscope in all views, 0.25 mL of Omnipaque was injected, and after seeing the proper spread a total of 1 mL of the anesthetic solution noted above was injected at each of these points.  Needles were removed intact from each of the areas.  A similar procedure was repeated to block the L3, L4, and L5 nerves on the contralateral side.   Onset of analgesia was noted.  Vital signs remained stable throughout.      ESTIMATED BLOOD LOSS:  <5 mL  SPECIMENS:  none    COMPLICATIONS:   No complications were noted., There was no indication of vascular uptake on live injection of contrast dye., There was no indication of intrathecal uptake on live injection of contrast dye., and There was not any evidence of dural puncture.      TOLERANCE & DISCHARGE CONDITION:    The patient tolerated the procedure well.  The patient was transported to the recovery area without difficulties.  The patient was discharged to home under the care of family in stable and satisfactory condition.    PLAN OF CARE:  The patient was given our standard instruction sheet.  We discussed that Lumbar Medial Branch Blockade is a diagnostic procedure in consideration for radiofrequency ablation if two diagnostic procedures prove to be positive for significant benefit.  If sustained relief of 6 to eight weeks is obtained, then an alternative plan could be therapeutic lumbar branch blockades.  The patient is asked to keep a pain log each hour for 8 hours after the procedure today.  The patient will  Return to clinic 3-4 wks.  The patient will resume all medications as per the medication reconciliation sheet.

## 2023-07-25 ENCOUNTER — TELEPHONE (OUTPATIENT)
Dept: PAIN MEDICINE | Facility: HOSPITAL | Age: 73
End: 2023-07-25
Payer: MEDICARE

## 2023-09-08 DIAGNOSIS — M47.817 LUMBOSACRAL SPONDYLOSIS WITHOUT MYELOPATHY: ICD-10-CM

## 2023-09-08 DIAGNOSIS — M51.36 DDD (DEGENERATIVE DISC DISEASE), LUMBAR: Primary | ICD-10-CM

## 2023-09-18 ENCOUNTER — HOSPITAL ENCOUNTER (OUTPATIENT)
Dept: PAIN MEDICINE | Facility: HOSPITAL | Age: 73
Discharge: HOME | End: 2023-09-18
Payer: MEDICARE

## 2023-09-18 VITALS
RESPIRATION RATE: 16 BRPM | SYSTOLIC BLOOD PRESSURE: 126 MMHG | HEIGHT: 64 IN | WEIGHT: 280 LBS | BODY MASS INDEX: 47.8 KG/M2 | OXYGEN SATURATION: 98 % | HEART RATE: 80 BPM | TEMPERATURE: 97.1 F | DIASTOLIC BLOOD PRESSURE: 81 MMHG

## 2023-09-18 DIAGNOSIS — R52 PAIN: ICD-10-CM

## 2023-09-18 DIAGNOSIS — M47.817 LUMBOSACRAL SPONDYLOSIS WITHOUT MYELOPATHY: Primary | ICD-10-CM

## 2023-09-18 PROCEDURE — 64494 INJ PARAVERT F JNT L/S 2 LEV: CPT | Performed by: STUDENT IN AN ORGANIZED HEALTH CARE EDUCATION/TRAINING PROGRAM

## 2023-09-18 PROCEDURE — 25510000001 IOPAMIDOL 41 % SOLUTION: Performed by: STUDENT IN AN ORGANIZED HEALTH CARE EDUCATION/TRAINING PROGRAM

## 2023-09-18 PROCEDURE — 25010000002 BUPIVACAINE (PF) 0.25 % SOLUTION: Performed by: STUDENT IN AN ORGANIZED HEALTH CARE EDUCATION/TRAINING PROGRAM

## 2023-09-18 PROCEDURE — 77003 FLUOROGUIDE FOR SPINE INJECT: CPT

## 2023-09-18 PROCEDURE — 64493 INJ PARAVERT F JNT L/S 1 LEV: CPT | Performed by: STUDENT IN AN ORGANIZED HEALTH CARE EDUCATION/TRAINING PROGRAM

## 2023-09-18 RX ORDER — HYDROCODONE BITARTRATE AND ACETAMINOPHEN 5; 325 MG/1; MG/1
TABLET ORAL
COMMUNITY
Start: 2023-09-03

## 2023-09-18 RX ORDER — BUPIVACAINE HYDROCHLORIDE 2.5 MG/ML
10 INJECTION, SOLUTION EPIDURAL; INFILTRATION; INTRACAUDAL ONCE
Status: COMPLETED | OUTPATIENT
Start: 2023-09-18 | End: 2023-09-18

## 2023-09-18 RX ADMIN — IOPAMIDOL 3 ML: 408 INJECTION, SOLUTION INTRATHECAL at 14:57

## 2023-09-18 RX ADMIN — BUPIVACAINE HYDROCHLORIDE 6 ML: 2.5 INJECTION, SOLUTION EPIDURAL; INFILTRATION; INTRACAUDAL; PERINEURAL at 14:58

## 2023-09-18 NOTE — PROCEDURES
Bilateral Diagnostic L3-5 Lumbar Medial Branch Blockade #2  [facility]      PREOPERATIVE DIAGNOSIS:  Lumbar spondylosis without myelopathy    POSTOPERATIVE DIAGNOSIS:  Lumbar spondylosis without myelopathy    PROCEDURE:   Diagnostic Bilateral Lumbar Medial Branch Nerve Blockades, with fluoroscopy:  L3, L4, and L5 nerves (at the L4 & L5 transverse processes and the sacral alar groove) to block facet joints L4-5, and L5-S1  18741-74 -- Bilateral Lumbar Facet blocks, 1st Level  [id number] -- Bilateral Lumbar Facet blocks, 2nd  Level    PRE-PROCEDURE DISCUSSION WITH PATIENT:    Risks and complications were discussed with the patient prior to starting the procedure and informed consent was obtained.      SURGEON:   Alen Camarena MD    REASON FOR PROCEDURE:    Increased back pain on range of motion exams, Pain on extension of the lumbar spine, and Positive lumbar facet loading maneuver    SEDATION:  Patient declined administration of moderate sedation    ANESTHETIC:  Marcaine 0.25%  STEROID:  NONE  TOTAL VOLUME OF SOLUTION:  6ml    DESCRIPTON OF PROCEDURE:  After obtaining informed consent, IV access was not obtained in the preoperative area.   The patient was taken to the operating room.  The patient was placed in the prone position with a pillow under the abdomen. All pressure points were well padded.  The lumbosacral area was prepped with Chloraprep and draped in a sterile fashion. Under fluoroscopic guidance the transverse processes of the L4 and L5 vertebrae at the junctions of the superior articular processes were identified on the right. Also identified was the groove between the ala and the superior articular process of the sacrum on the ipsilateral side.  Skin and subcutaneous tissue were anesthetized with 1% lidocaine above each of these points. A 22-gauge spinal needle was introduced under fluoroscopic guidance at the above junctions. Aspiration was negative for blood and CSF.  After confirming the  position of the needle with fluoroscope in all views, 0.25 mL of Omnipaque was injected, and after seeing the proper spread a total of 1 mL of the anesthetic solution noted above was injected at each of these points.  Needles were removed intact from each of the areas.  A similar procedure was repeated to block the L3, L4, and L5 nerves on the contralateral side.   Onset of analgesia was noted.  Vital signs remained stable throughout.      ESTIMATED BLOOD LOSS:  <5 mL  SPECIMENS:  none    COMPLICATIONS:   No complications were noted., There was no indication of vascular uptake on live injection of contrast dye., There was no indication of intrathecal uptake on live injection of contrast dye., and There was not any evidence of dural puncture.      TOLERANCE & DISCHARGE CONDITION:    The patient tolerated the procedure well.  The patient was transported to the recovery area without difficulties.  The patient was discharged to home under the care of family in stable and satisfactory condition.    PLAN OF CARE:  The patient was given our standard instruction sheet.  We discussed that Lumbar Medial Branch Blockade is a diagnostic procedure in consideration for radiofrequency ablation if two diagnostic procedures prove to be positive for significant benefit.  If sustained relief of 6 to eight weeks is obtained, then an alternative plan could be therapeutic lumbar branch blockades.  The patient is asked to keep a pain log each hour for 8 hours after the procedure today.  The patient will  Plan for Radiofrequency procedure when authorized..  The patient will resume all medications as per the medication reconciliation sheet.    Procedures:   7/24/23: B/L LMBB L4/5, L5/S1 Diagnostic 80% relief for 2 wks  9/18/23: B/L LLMBB L4/5, L5/S1 Diagnostic: 80% benefit

## 2023-09-19 ENCOUNTER — TELEPHONE (OUTPATIENT)
Dept: PAIN MEDICINE | Facility: HOSPITAL | Age: 73
End: 2023-09-19
Payer: MEDICARE

## 2023-09-19 NOTE — TELEPHONE ENCOUNTER
Post procedure phone call completed.  Pt states they are doing good and denies questions or concerns. Patient reported 85-90% of pain relief post procedure.

## 2023-10-02 ENCOUNTER — PATIENT MESSAGE (OUTPATIENT)
Dept: PAIN MEDICINE | Facility: CLINIC | Age: 73
End: 2023-10-02
Payer: MEDICARE

## 2023-10-02 DIAGNOSIS — M48.061 SPINAL STENOSIS AT L4-L5 LEVEL: ICD-10-CM

## 2023-10-02 RX ORDER — PREGABALIN 150 MG/1
150 CAPSULE ORAL 3 TIMES DAILY
Qty: 90 CAPSULE | Refills: 1 | Status: SHIPPED | OUTPATIENT
Start: 2023-10-02

## 2023-10-02 NOTE — TELEPHONE ENCOUNTER
From: Desire Short  To: Alen Camarena  Sent: 10/2/2023 12:49 PM EDT  Subject: Lyrics refill    I have my ablation Wednesday but I also need to get my Lyrica prescription refilled. Can someone send in a refill for me this week? Thank you

## 2023-10-03 ENCOUNTER — TELEPHONE (OUTPATIENT)
Dept: PAIN MEDICINE | Facility: HOSPITAL | Age: 73
End: 2023-10-03
Payer: MEDICARE

## 2023-10-03 NOTE — TELEPHONE ENCOUNTER
Pre procedure call made, patient aware to arrive at 0900 and that a  is required. Also reviewed NPO status.

## 2023-10-04 ENCOUNTER — HOSPITAL ENCOUNTER (OUTPATIENT)
Dept: PAIN MEDICINE | Facility: HOSPITAL | Age: 73
Discharge: HOME | End: 2023-10-04
Payer: MEDICARE

## 2023-10-04 VITALS
TEMPERATURE: 97.1 F | WEIGHT: 280 LBS | OXYGEN SATURATION: 98 % | HEIGHT: 64 IN | SYSTOLIC BLOOD PRESSURE: 127 MMHG | RESPIRATION RATE: 22 BRPM | HEART RATE: 73 BPM | DIASTOLIC BLOOD PRESSURE: 70 MMHG | BODY MASS INDEX: 47.8 KG/M2

## 2023-10-04 DIAGNOSIS — M47.817 LUMBOSACRAL SPONDYLOSIS WITHOUT MYELOPATHY: Primary | ICD-10-CM

## 2023-10-04 DIAGNOSIS — R52 PAIN: ICD-10-CM

## 2023-10-04 PROCEDURE — 25010000002 MIDAZOLAM PER 1 MG

## 2023-10-04 PROCEDURE — 25010000002 FENTANYL CITRATE (PF) 50 MCG/ML SOLUTION

## 2023-10-04 PROCEDURE — 25010000002 METHYLPREDNISOLONE PER 40 MG: Performed by: STUDENT IN AN ORGANIZED HEALTH CARE EDUCATION/TRAINING PROGRAM

## 2023-10-04 PROCEDURE — 77003 FLUOROGUIDE FOR SPINE INJECT: CPT

## 2023-10-04 RX ORDER — FENTANYL CITRATE 50 UG/ML
100 INJECTION, SOLUTION INTRAMUSCULAR; INTRAVENOUS
Status: DISCONTINUED | OUTPATIENT
Start: 2023-10-04 | End: 2023-10-05 | Stop reason: HOSPADM

## 2023-10-04 RX ORDER — LIDOCAINE HYDROCHLORIDE 20 MG/ML
5 INJECTION, SOLUTION EPIDURAL; INFILTRATION; INTRACAUDAL; PERINEURAL ONCE
Status: COMPLETED | OUTPATIENT
Start: 2023-10-04 | End: 2023-10-04

## 2023-10-04 RX ORDER — MIDAZOLAM HYDROCHLORIDE 1 MG/ML
INJECTION INTRAMUSCULAR; INTRAVENOUS
Status: COMPLETED
Start: 2023-10-04 | End: 2023-10-04

## 2023-10-04 RX ORDER — MIDAZOLAM HYDROCHLORIDE 1 MG/ML
2 INJECTION INTRAMUSCULAR; INTRAVENOUS ONCE
Status: COMPLETED | OUTPATIENT
Start: 2023-10-04 | End: 2023-10-04

## 2023-10-04 RX ORDER — METHYLPREDNISOLONE ACETATE 40 MG/ML
40 INJECTION, SUSPENSION INTRA-ARTICULAR; INTRALESIONAL; INTRAMUSCULAR; SOFT TISSUE ONCE
Status: COMPLETED | OUTPATIENT
Start: 2023-10-04 | End: 2023-10-04

## 2023-10-04 RX ORDER — FENTANYL CITRATE 50 UG/ML
INJECTION, SOLUTION INTRAMUSCULAR; INTRAVENOUS
Status: COMPLETED
Start: 2023-10-04 | End: 2023-10-04

## 2023-10-04 RX ADMIN — LIDOCAINE HYDROCHLORIDE 5 ML: 20 INJECTION, SOLUTION EPIDURAL; INFILTRATION; INTRACAUDAL; PERINEURAL at 09:49

## 2023-10-04 RX ADMIN — METHYLPREDNISOLONE ACETATE 40 MG: 40 INJECTION, SUSPENSION INTRA-ARTICULAR; INTRALESIONAL; INTRAMUSCULAR; INTRASYNOVIAL; SOFT TISSUE at 09:57

## 2023-10-04 RX ADMIN — MIDAZOLAM 1 MG: 1 INJECTION INTRAMUSCULAR; INTRAVENOUS at 09:38

## 2023-10-04 RX ADMIN — FENTANYL CITRATE 100 MCG: 50 INJECTION, SOLUTION INTRAMUSCULAR; INTRAVENOUS at 09:38

## 2023-10-04 RX ADMIN — MIDAZOLAM HYDROCHLORIDE 1 MG: 1 INJECTION INTRAMUSCULAR; INTRAVENOUS at 09:38

## 2023-10-04 NOTE — DISCHARGE INSTRUCTIONS
Radiofrequency Lesioning, Care After    Refer to this sheet in the next few weeks. These instructions provide you with information about caring for yourself after your procedure. Your health care provider may also give you more specific instructions. Your treatment has been planned according to current medical practices, but problems sometimes occur. Call your health care provider if you have any problems or questions after your procedure.  What can I expect after the procedure?  After the procedure, it is common to have:  Pain from the burned nerve.  You may feel a burning sensation for up to 1-2 weeks after the procedure  Temporary numbness at the site  Your leg/legs may be weak or feel numb after the procedure until the numbing medication wears off.  When you are up and walking, have assistance to prevent falling.     Follow these instructions at home:  Take over-the-counter and prescription medicines only as told by your health care provider.  Return to your normal activities as told by your health care provider. Ask your health care provider what activities are safe for you.  Pay close attention to how you feel after the procedure. If you start to have pain, write down when it hurts and how it feels. This will help you and your health care provider to know if you need an additional treatment.  Check your needle insertion site every day for signs of infection. Watch for:  Redness, swelling, or pain.  Fluid, blood, or pus.  Keep all follow-up visits as told by your health care provider. This is important.  No driving for 24 hrs-make sure you have full sensation in your legs prior to driving.  Avoid using heat on the injection site for 24 hours. You may use ice intermittently if needed by placing a               towel between your skin and the ice bag and using the ice for 20 minutes 2-3 times a day.  Do not take baths, swim or use a hot tub for 24 hours.  Leave your band-aids on for 24 hours and keep them  dry.    Contact a health care provider if:  Your pain does not get better.  You have redness, swelling, or pain at the needle insertion site.  You have fluid, blood, or pus coming from the needle insertion site.  You have a fever over 101 degrees.  You have new numb.ness in your arm or leg after the procedure    Get help right away if:  You develop sudden, severe pain.  You develop numbness or tingling near the procedure site that does not go away.  This information is not intended to replace advice given to you by your health care provider. Make sure you discuss any questions you have with your health care provider.  Document Released: 08/16/2012 Document Revised: 05/25/2017 Document Reviewed: 01/25/2016  [employer] Interactive Patient Education © 2019 [employer]  Moderate Conscious Sedation, Adult, Care After    This sheet gives you information about how to care for yourself after your procedure. Your health care provider may also give you more specific instructions. If you have problems or questions, contact your health care provider.    What can I expect after the procedure?  After the procedure, it is common to have:  Sleepiness for several hours.  Impaired judgment for several hours.  Difficulty with balance.  Vomiting if you eat too soon.    Follow these instructions at home:  For the next 24 hours:         Rest.  Do not participate in activities where you could fall or become injured.  Do not drive or use machinery.  Do not drink alcohol.  Do not take sleeping pills or medicines that cause drowsiness.  Do not make important decisions or sign legal documents.  Do not take care of children on your own.    Eating and drinking    Follow the diet recommended by your health care provider.  Drink enough fluid to keep your urine pale yellow.  If you vomit:  Drink water, juice, or soup when you can drink without vomiting.  Make sure you have little or no nausea before eating solid foods.     General  instructions  Take over-the-counter and prescription medicines only as told by your health care provider.  Have a responsible adult stay with you for 24 hours. It is important to have someone help care for you until you are awake and alert.  Do not smoke.  Keep all follow-up visits as told by your health care provider. This is important.    Contact a health care provider if:  You are still sleepy or having trouble with balance after 24 hours.  You feel light-headed.  You keep feeling nauseous or you keep vomiting.  You develop a rash.  You have a fever.  You have redness or swelling around the IV site.    Get help right away if:  You have trouble breathing.  You have new-onset confusion at home.    Summary  After the procedure, it is common to feel sleepy, have impaired judgment, or feel nauseous if you eat too soon.  Rest after you get home. Know the things you should not do after the procedure.  Follow the diet recommended by your health care provider and drink enough fluid to keep your urine pale yellow.  Get help right away if you have trouble breathing or new-onset confusion at home.    This information is not intended to replace advice given to you by your health care provider. Make sure you discuss any questions you have with your health care provider.    Document Revised: 04/16/2021 Document Reviewed: 11/12/2020  [employer] Patient Education © 2021 [employer]

## 2023-10-04 NOTE — PROCEDURES
Bilateral L3-5 Lumbar Medial Branch RADIOFREQUENCY  [facility]      PREOPERATIVE DIAGNOSIS:  Lumbar spondylosis without myelopathy    POSTOPERATIVE DIAGNOSIS:  Lumbar spondylosis without myelopathy    PROCEDURE:   Diagnostic Bilateral Lumbar Medial Branch Nerve thermal radiofrequency lesioning, with fluoroscopy:  L3, L4, and L5 nerves (at the L4 and L5 transverse processes and the sacral alar groove) to thermally treat the innervation to facet joints L4-5 and L5-S1  45607-52 -- Bilateral L/S facet neuro destr., 1st Level  80593-99 -- Bilateral L/S facet neuro destr., 2nd  Level    PRE-PROCEDURE DISCUSSION WITH PATIENT:    Risks and complications were discussed with the patient prior to starting the procedure and informed consent was obtained.      SURGEON:  Chris Camarena MD    REASON FOR PROCEDURE:    The patient admits to 80% or more pain relief diagnostically from medial branch blockades., Increased back pain on range of motion exams, Pain on extension of the lumbar spine, and Positive lumbar facet loading maneuver    SEDATION:  Fentanyl 100 mcg IV and Midazolam 1mg  TIME OF PROCEDURE:   The intraoperative procedure time after administration of the sedative was 24 minutes.     TO: 0938  Proc End: 1002    ANESTHETIC:  Lidocaine 2%  STEROID:  Methylprednisolone (DEPO MEDROL) 40mg/ml      DESCRIPTON OF PROCEDURE:  After obtaining informed consent, IV access  was obtained in the preoperative area.   The patient was taken to the operating room.  The patient was placed in the prone position with a pillow under the abdomen. All pressure points were well padded.  EKG, blood pressure, and pulse oximeter were monitored.  The patient was monitored and sedated by the RN under my direction. The lumbosacral area was prepped with Chloraprep and draped in a sterile fashion.     Under fluoroscopic guidance the transverse processes of the L4 and L5 vertebrae at the junctions of the superior articular processes were  identified on the right.  Also identified was the groove between the ala and the superior articular process of the sacrum on the ipsilateral side.  Skin and subcutaneous tissue were anesthetized with 1ml of 1% lidocaine above each of these points. Then, radiofrequency probe needles were advanced in this fluoro view to the above junctions.  Aspiration was negative for blood and CSF.  After confirming the position of the needle with fluoroscope in all views, testing was initiated.  Motor testing was confirmed to be negative at 3V and 2Hz for any radicular stimulation.  Then 1mL of the local anesthetic was instilled in each needle.  Two minutes elapsed, and during this time a lateral fluoroscopic view was confirmed again to ensure the needles had not advanced nor retracted.  Then, Radiofrequency Lesioning was initiated for 2.5 minutes at 80 degrees Celsius, rotating the needle tips 90 degrees 1/2 way through.  Needles were removed intact from each of the areas.     A similar procedure was repeated to address the L3, L4, and L5 nerves on the contralateral side.   Onset of analgesia was noted.  Vital signs remained stable throughout.      ESTIMATED BLOOD LOSS:  <5 mL  SPECIMENS:  none    COMPLICATIONS:   No complications were noted.    TOLERANCE & DISCHARGE CONDITION:    The patient tolerated the procedure well.  The patient was transported to the recovery area without difficulties.  The patient was discharged to home under the care of family in stable and satisfactory condition.    PLAN OF CARE:  The patient was given our standard instruction sheet.  The patient will  Return to clinic in 8 wks.  The patient will resume all medications as per the medication reconciliation sheet.

## 2023-10-05 ENCOUNTER — TELEPHONE (OUTPATIENT)
Dept: PAIN MEDICINE | Facility: HOSPITAL | Age: 73
End: 2023-10-05
Payer: MEDICARE

## 2023-11-27 ENCOUNTER — OFFICE VISIT (OUTPATIENT)
Dept: PAIN MEDICINE | Facility: CLINIC | Age: 73
End: 2023-11-27
Payer: MEDICARE

## 2023-11-27 VITALS
DIASTOLIC BLOOD PRESSURE: 70 MMHG | RESPIRATION RATE: 16 BRPM | SYSTOLIC BLOOD PRESSURE: 142 MMHG | HEART RATE: 90 BPM | OXYGEN SATURATION: 99 %

## 2023-11-27 DIAGNOSIS — M25.562 CHRONIC PAIN OF BOTH KNEES: Primary | ICD-10-CM

## 2023-11-27 DIAGNOSIS — G89.29 CHRONIC PAIN OF BOTH KNEES: Primary | ICD-10-CM

## 2023-11-27 DIAGNOSIS — M48.061 SPINAL STENOSIS AT L4-L5 LEVEL: ICD-10-CM

## 2023-11-27 DIAGNOSIS — M25.561 CHRONIC PAIN OF BOTH KNEES: Primary | ICD-10-CM

## 2023-11-27 PROCEDURE — 1159F MED LIST DOCD IN RCRD: CPT | Performed by: STUDENT IN AN ORGANIZED HEALTH CARE EDUCATION/TRAINING PROGRAM

## 2023-11-27 PROCEDURE — 1125F AMNT PAIN NOTED PAIN PRSNT: CPT | Performed by: STUDENT IN AN ORGANIZED HEALTH CARE EDUCATION/TRAINING PROGRAM

## 2023-11-27 PROCEDURE — 99214 OFFICE O/P EST MOD 30 MIN: CPT | Performed by: STUDENT IN AN ORGANIZED HEALTH CARE EDUCATION/TRAINING PROGRAM

## 2023-11-27 PROCEDURE — 1160F RVW MEDS BY RX/DR IN RCRD: CPT | Performed by: STUDENT IN AN ORGANIZED HEALTH CARE EDUCATION/TRAINING PROGRAM

## 2023-11-27 RX ORDER — PREGABALIN 150 MG/1
150 CAPSULE ORAL 2 TIMES DAILY
Qty: 180 CAPSULE | Refills: 1 | Status: SHIPPED | OUTPATIENT
Start: 2023-11-27

## 2023-11-27 NOTE — PROGRESS NOTES
CHIEF COMPLAINT  Chief Complaint   Patient presents with    Back Pain     8 wk f/u from Medial Branch Block  CC  Lumbar pain Treated w/Hydrocodone LD @ 5 pm 11/26       Primary Care  Emelina Lopez APRN Subjective   Desire Short is a 73 y.o. female  who presents for chronic low back pain with bilateral lumbar radiculopathy.  She reports pain in the lower lumbar region which tends to radiate into the hips bilaterally.  She does have some focal tenderness across the SI joints bilaterally as well.  She describes pain especially with prolonged standing and walking which improves with leaning forward and resting.  She tends to have minimal pain with sitting and resting.  She is currently taking hydrocodone from her primary care provider but does not get significant pain relief with the medications.  She recently had an MRI which does show several areas of mild to moderate stenosis which could be contributing to her pain.  She denies any significant red flag symptoms such as loss of bowel or bladder or significant numbness or weakness.    Back Pain  Pertinent negatives include no numbness or weakness.   Knee Pain   Pertinent negatives include no numbness.        Location: Low back with radiation to the hips bilaterally  Onset: Years ago  Duration: Slowly worsening  Timing: Constant throughout the day  Quality: Aching, cramping  Severity: Today: 3       Last Week: 3       Worst: 7  Modifying Factors: The pain is worse with movement and physical activity and standing and slightly improved with sitting  Functional Deficit: The pain makes it difficult for her to perform her activities of daily living and stand for prolonged periods of time    Physical Therapy: no    Interval Update 11/27/2023: She is overall doing very well after her lumbar RFA.  She has much improved back pain.  She is now complaining of recurrent knee pain.  She states that she recently fell and landed on her knees bilaterally causing increased  pain    The following portions of the patient's history were reviewed and updated as appropriate: allergies, current medications, past family history, past medical history, past social history, past surgical history and problem list.    Procedures:  6/7/2023: Left knee injection with Zilretta: 100% relief      Current Outpatient Medications:     albuterol sulfate  (90 Base) MCG/ACT inhaler, Inhale 2 puffs., Disp: , Rfl:     atorvastatin (LIPITOR) 20 MG tablet, Take 1 tablet by mouth Daily. 200001, Disp: , Rfl: 0    Breo Ellipta 200-25 MCG/INH inhaler, Inhale 1 puff Daily., Disp: , Rfl:     celecoxib (CeleBREX) 200 MG capsule, CELEBREX 200 MG CAPS, Disp: , Rfl:     cetirizine (zyrTEC) 10 MG tablet, Take 1 tablet by mouth., Disp: , Rfl:     Cholecalciferol 1000 units tablet, Take 2 tablets by mouth Daily., Disp: , Rfl:     cyclobenzaprine (FLEXERIL) 10 MG tablet, TAKE 1 TABLET BY MOUTH THREE TIMES A DAY AS NEEDED FOR MUSCLE SPASM (FLEXERIL), Disp: , Rfl:     cyclobenzaprine (FLEXERIL) 10 MG tablet, Take 1 tablet by mouth., Disp: , Rfl:     DULoxetine (CYMBALTA) 60 MG capsule, TAKE 1 CAPSULE BY MOUTH ONE TIME A DAY, Disp: , Rfl:     ferrous sulfate 325 (65 FE) MG EC tablet, Take 1 tablet by mouth daily with breakfast Take on empty stomach, with something acidic., Disp: , Rfl:     HYDROcodone-acetaminophen (NORCO) 5-325 MG per tablet, TAKE 1 TABLET BY MOUTH 2 TIMES A DAY AS NEEDED FOR PAIN max daily amount 2 tablets, Disp: , Rfl:     lisinopril-hydrochlorothiazide (PRINZIDE,ZESTORETIC) 20-25 MG per tablet, Take 1 tablet by mouth Daily., Disp: , Rfl:     pantoprazole (PROTONIX) 40 MG EC tablet, TAKE ONE TABLET BY MOUTH DAILY AT 9AM, Disp: , Rfl:     pramipexole (MIRAPEX) 0.25 MG tablet, TAKE 1/2 TABLET BY MOUTH AT BEDTIME FOR 1 WEEK THEN TAKE ONE TABLET BY MOUTH DAILY AT 9PM AT BEDTIME (vial), Disp: , Rfl:     pregabalin (LYRICA) 150 MG capsule, Take 1 capsule by mouth 2 (Two) Times a Day., Disp: 180 capsule, Rfl:  1    PROAIR  (90 Base) MCG/ACT inhaler, , Disp: , Rfl:     traMADol (ULTRAM) 50 MG tablet, Take 1 tablet by mouth Every 8 (Eight) Hours As Needed. for pain, Disp: , Rfl:     Current Facility-Administered Medications:     Hyaluronan (ORTHOVISC) injection 30 mg, 30 mg, Intra-articular, Weekly, Kaleb Teague MD, 30 mg at 09/24/20 1057    Hyaluronan (ORTHOVISC) injection 30 mg, 30 mg, Intra-articular, Weekly, Kaleb Teague MD, 30 mg at 09/24/20 1057    Hyaluronan (ORTHOVISC) injection 30 mg, 30 mg, Intra-articular, Weekly, Kaleb Teague MD, 30 mg at 10/01/20 1052    Hyaluronan (ORTHOVISC) injection 30 mg, 30 mg, Intra-articular, Weekly, Kaleb Teague MD, 30 mg at 10/01/20 1051    Hyaluronan (ORTHOVISC) injection 30 mg, 30 mg, Intra-articular, Weekly, Kaleb Teague MD, 30 mg at 10/08/20 1106    Hyaluronan (ORTHOVISC) injection 30 mg, 30 mg, Intra-articular, Weekly, Kaleb Teague MD, 30 mg at 10/08/20 1105    Review of Systems   Musculoskeletal:  Positive for arthralgias, back pain, gait problem and myalgias. Negative for joint swelling.   Neurological:  Negative for weakness and numbness.       Vitals:    11/27/23 0904   BP: 142/70   BP Location: Right arm   Patient Position: Sitting   Cuff Size: Adult   Pulse: 90   Resp: 16   SpO2: 99%   PainSc:   4   PainLoc: Knee         Objective   Physical Exam  Vitals and nursing note reviewed.   Constitutional:       Appearance: She is well-developed.   Neck:      Trachea: No tracheal deviation.   Musculoskeletal:      Right knee: No bony tenderness. Decreased range of motion. Tenderness present.      Left knee: No bony tenderness. Decreased range of motion. Tenderness present.      Comments: Lumbar Spine Exam:  Tender to palpation over the lumbar paraspinal musculature No  Limited range of motion secondary to pain yes  Facet loading positive: Bilateral  Facets tender to palpation: Bilateral  Straight leg raise  test positive: Negative    SI Joint Exam:  David positive: Negative  PSIS tender: bilateral   SI joint palpation: bilateral  SI joint compression: Negative     Neurological:      General: No focal deficit present.      Mental Status: She is alert.      Sensory: No sensory deficit.      Motor: No weakness.           Assessment & Plan   Problems Addressed this Visit    None  Visit Diagnoses       Chronic pain of both knees    -  Primary    Relevant Orders    Large Joint Arthrocentesis    Spinal stenosis at L4-L5 level        Relevant Medications    pregabalin (LYRICA) 150 MG capsule          Diagnoses         Codes Comments    Chronic pain of both knees    -  Primary ICD-10-CM: M25.561, M25.562, G89.29  ICD-9-CM: 719.46, 338.29     Spinal stenosis at L4-L5 level     ICD-10-CM: M48.061  ICD-9-CM: 724.02             Plan:  Good benefit with her medial branch block and RFA.  Limited benefit with LESI  Continue Lyrica 150 mg twice daily  We will plan for bilateral Zilretta injections as she got excellent benefit prior and has had a recurrence of pain.  She is also having pain in the right knee as she recently fell  --- Follow-up next available for bilateral knee injections with Zilretta         INSPECT REPORT    As part of the patient's treatment plan, I may be prescribing controlled substances. The patient has been made aware of appropriate use of such medications, including potential risk of somnolence, limited ability to drive and/or work safely, and the potential for dependence or overdose. It has also bee made clear that these medications are for use by this patient only, without concomitant use of alcohol or other substances unless prescribed.     Patient has completed prescribing agreement detailing terms of continued prescribing of controlled substances, including monitoring AMA reports, urine drug screening, and pill counts if necessary. The patient is aware that inappropriate use will results in cessation of  prescribing such medications.    INSPECT report has been reviewed and scanned into the patient's chart.    As the clinician, I personally reviewed the INSPECT from 11/22/2023.    History and physical exam exhibit continued safe and appropriate use of controlled substances.      EMR Dragon/Transcription disclaimer:   Much of this encounter note is an electronic transcription/translation of spoken language to printed text. The electronic translation of spoken language may permit erroneous, or at times, nonsensical words or phrases to be inadvertently transcribed; Although I have reviewed the note for such errors, some may still exist.

## 2023-12-06 ENCOUNTER — HOSPITAL ENCOUNTER (OUTPATIENT)
Dept: PAIN MEDICINE | Facility: HOSPITAL | Age: 73
Discharge: HOME | End: 2023-12-06
Payer: MEDICARE

## 2023-12-06 VITALS
HEIGHT: 64 IN | RESPIRATION RATE: 16 BRPM | DIASTOLIC BLOOD PRESSURE: 87 MMHG | OXYGEN SATURATION: 97 % | WEIGHT: 280 LBS | TEMPERATURE: 96.9 F | BODY MASS INDEX: 47.8 KG/M2 | HEART RATE: 90 BPM | SYSTOLIC BLOOD PRESSURE: 132 MMHG

## 2023-12-06 DIAGNOSIS — R52 PAIN: ICD-10-CM

## 2023-12-06 DIAGNOSIS — M25.562 CHRONIC PAIN OF BOTH KNEES: Primary | ICD-10-CM

## 2023-12-06 DIAGNOSIS — G89.29 CHRONIC PAIN OF BOTH KNEES: Primary | ICD-10-CM

## 2023-12-06 DIAGNOSIS — M25.561 CHRONIC PAIN OF BOTH KNEES: Primary | ICD-10-CM

## 2023-12-06 PROCEDURE — 77003 FLUOROGUIDE FOR SPINE INJECT: CPT

## 2023-12-06 PROCEDURE — 25510000001 IOPAMIDOL 41 % SOLUTION: Performed by: STUDENT IN AN ORGANIZED HEALTH CARE EDUCATION/TRAINING PROGRAM

## 2023-12-06 PROCEDURE — 25010000002 TRIAMCINOLONE ACETONIDE 32 MG SUSPENSION RECONSTITUTED ER

## 2023-12-06 RX ORDER — IOPAMIDOL 408 MG/ML
3 INJECTION, SOLUTION INTRATHECAL
Status: COMPLETED | OUTPATIENT
Start: 2023-12-06 | End: 2023-12-06

## 2023-12-06 RX ADMIN — IOPAMIDOL 3 ML: 408 INJECTION, SOLUTION INTRATHECAL at 09:19

## 2023-12-06 RX ADMIN — TRIAMCINOLONE ACETONIDE EXTENDED-RELEASE INJECTABLE SUSPENSION 32 MG: KIT INTRA-ARTICULAR at 09:21

## 2023-12-06 RX ADMIN — TRIAMCINOLONE ACETONIDE EXTENDED-RELEASE INJECTABLE SUSPENSION 32 MG: KIT INTRA-ARTICULAR at 09:19

## 2023-12-06 NOTE — PROCEDURES
Bilateral Knee Intraarticular injections under fluoroscopic guidance  [facility]      PREOPERATIVE DIAGNOSIS:  Chronic, severe, knee osteoarthritis, knee pain    POSTOPERATIVE DIAGNOSIS:  Same as preop diagnosis    PROCEDURE:   Bilateral Knee Intraarticular injections under fluoroscopic guidance      PRE-PROCEDURE DISCUSSION WITH PATIENT:    Risks and complications were discussed with the patient prior to starting the procedure and informed consent was obtained.  We discussed various topics including but not limited to bleeding, infection, injury, nerve injury, paralysis, coma, death, postprocedural painful flare-up, postprocedural site soreness, and a lack of pain relief.        SURGEON:  Chris Camarena MD    REASON FOR PROCEDURE:    Chronic knee pain, osteoarhtritis.  Because of this, I felt that fluoroscopic assistance was necessary to confirm that we entered the joint space.      SEDATION:  Patient declined administration of moderate sedation    ANESTHETIC:  NONE  STEROID:   Zilretta 32mg        DESCRIPTON OF PROCEDURE:    After obtaining informed consent, an I.V. was not started in the preoperative area. The patient taken to the operating room and was placed in the supine position.  All pressure points were well padded.  The appropriate area was prepped with Chloraprep and draped in a sterile fashion.     The above indicated solution was prepared and the area over the entirety of the knee was prepared with a wide surgical prep of chlorhexidine solution.    On the LEFT KNEE, a point on the lateral aspect of the joint line was identified to enter into the joint space, and the sterile needle was inserted parallel with a slight posterior angle to enter easily into the joint space.  Aspiration was negative.   Contrast dye was injected and a proper arthrogram was seen.   Then, slowly, the aforementioned solution was injected easily into the joint space.     On the RIGHT KNEE, a point on the lateral aspect of  the joint line was identified to enter into the joint space, and the sterile needle was inserted parallel with a slight posterior angle to enter easily into the joint space.  Aspiration was negative.   Contrast dye was injected and a proper arthrogram was seen.   Then, slowly, the aforementioned solution was injected easily into the joint space.       The needle was removed intact from each site and bleeding was minimal. The insertion site was dressed with a Band-Aid.        ESTIMATED BLOOD LOSS:  <5 mL  SPECIMENS:  none      COMPLICATIONS:   No complications were noted. and There was no indication of vascular uptake on live injection of contrast dye.      TOLERANCE & DISCHARGE CONDITION:    The patient tolerated the procedure well.  The patient was transported to the recovery area without difficulties.  The patient was discharged to home under the care of family in stable and satisfactory condition.      PLAN OF CARE:  The patient was given our standard instruction sheet.  The patient will Return to clinic 3-4 wks.  The patient will resume all medications as per the medication reconciliation sheet.

## 2023-12-07 ENCOUNTER — TELEPHONE (OUTPATIENT)
Dept: PAIN MEDICINE | Facility: HOSPITAL | Age: 73
End: 2023-12-07
Payer: MEDICARE

## 2023-12-20 NOTE — TELEPHONE ENCOUNTER
Attempted post procedure phone call but no answer.  No message left on answering machine . Pt. did not identify self on answering machine so no message left.    
I have reviewed and confirmed nurses' notes for patient's medications, allergies, medical history, and surgical history.

## 2023-12-27 ENCOUNTER — OFFICE VISIT (OUTPATIENT)
Dept: PAIN MEDICINE | Facility: CLINIC | Age: 73
End: 2023-12-27
Payer: MEDICARE

## 2023-12-27 VITALS
SYSTOLIC BLOOD PRESSURE: 158 MMHG | RESPIRATION RATE: 16 BRPM | BODY MASS INDEX: 48.83 KG/M2 | OXYGEN SATURATION: 99 % | DIASTOLIC BLOOD PRESSURE: 81 MMHG | HEART RATE: 84 BPM | WEIGHT: 284.5 LBS

## 2023-12-27 DIAGNOSIS — G89.29 CHRONIC PAIN OF BOTH KNEES: Primary | ICD-10-CM

## 2023-12-27 DIAGNOSIS — M48.061 SPINAL STENOSIS AT L4-L5 LEVEL: ICD-10-CM

## 2023-12-27 DIAGNOSIS — M25.561 CHRONIC PAIN OF BOTH KNEES: Primary | ICD-10-CM

## 2023-12-27 DIAGNOSIS — M25.562 CHRONIC PAIN OF BOTH KNEES: Primary | ICD-10-CM

## 2023-12-27 RX ORDER — PREGABALIN 150 MG/1
150 CAPSULE ORAL 2 TIMES DAILY
Qty: 180 CAPSULE | Refills: 2 | Status: SHIPPED | OUTPATIENT
Start: 2023-12-27

## 2023-12-27 NOTE — PROGRESS NOTES
CHIEF COMPLAINT  Chief Complaint   Patient presents with    Knee Pain     3 wk f/u from Bilateral knee injection  CC  Bilateral Knee pain, DDD, Lumbar Treated w/Hydrocodone LD @ 1 pm on 12/26       Primary Care  Emelina Lopez APRN Subjective   Desire Short is a 73 y.o. female  who presents for chronic low back pain with bilateral lumbar radiculopathy.  She reports pain in the lower lumbar region which tends to radiate into the hips bilaterally.  She does have some focal tenderness across the SI joints bilaterally as well.  She describes pain especially with prolonged standing and walking which improves with leaning forward and resting.  She tends to have minimal pain with sitting and resting.  She is currently taking hydrocodone from her primary care provider but does not get significant pain relief with the medications.  She recently had an MRI which does show several areas of mild to moderate stenosis which could be contributing to her pain.  She denies any significant red flag symptoms such as loss of bowel or bladder or significant numbness or weakness.    Back Pain  Pertinent negatives include no numbness or weakness.   Knee Pain   Pertinent negatives include no numbness.        Location: Low back with radiation to the hips bilaterally  Onset: Years ago  Duration: Slowly worsening  Timing: Constant throughout the day  Quality: Aching, cramping  Severity: Today: 3       Last Week: 3       Worst: 7  Modifying Factors: The pain is worse with movement and physical activity and standing and slightly improved with sitting  Functional Deficit: The pain makes it difficult for her to perform her activities of daily living and stand for prolonged periods of time    Physical Therapy: no    Interval Update 12/27/2023: Overall, she is doing very well.  She continues with good benefit with her RFA.  Excellent benefit with most recent knee injection.  Continues with Lyrica    The following portions of the patient's  history were reviewed and updated as appropriate: allergies, current medications, past family history, past medical history, past social history, past surgical history and problem list.    Procedures:  6/7/2023: Left knee injection with Zilretta: 100% relief  10/4/2023: Bilateral L4-5, L5-S1 lumbar RFA: 80% benefit  12/6/2023: Bilateral Zilretta injection: 90% benefit      Current Outpatient Medications:     albuterol sulfate  (90 Base) MCG/ACT inhaler, Inhale 2 puffs., Disp: , Rfl:     atorvastatin (LIPITOR) 20 MG tablet, Take 1 tablet by mouth Daily. 200001, Disp: , Rfl: 0    Breo Ellipta 200-25 MCG/INH inhaler, Inhale 1 puff Daily., Disp: , Rfl:     celecoxib (CeleBREX) 200 MG capsule, CELEBREX 200 MG CAPS, Disp: , Rfl:     cetirizine (zyrTEC) 10 MG tablet, Take 1 tablet by mouth., Disp: , Rfl:     Cholecalciferol 1000 units tablet, Take 2 tablets by mouth Daily., Disp: , Rfl:     cyclobenzaprine (FLEXERIL) 10 MG tablet, TAKE 1 TABLET BY MOUTH THREE TIMES A DAY AS NEEDED FOR MUSCLE SPASM (FLEXERIL), Disp: , Rfl:     cyclobenzaprine (FLEXERIL) 10 MG tablet, Take 1 tablet by mouth., Disp: , Rfl:     DULoxetine (CYMBALTA) 60 MG capsule, TAKE 1 CAPSULE BY MOUTH ONE TIME A DAY, Disp: , Rfl:     ferrous sulfate 325 (65 FE) MG EC tablet, Take 1 tablet by mouth daily with breakfast Take on empty stomach, with something acidic., Disp: , Rfl:     HYDROcodone-acetaminophen (NORCO) 5-325 MG per tablet, TAKE 1 TABLET BY MOUTH 2 TIMES A DAY AS NEEDED FOR PAIN max daily amount 2 tablets, Disp: , Rfl:     lisinopril-hydrochlorothiazide (PRINZIDE,ZESTORETIC) 20-25 MG per tablet, Take 1 tablet by mouth Daily., Disp: , Rfl:     pantoprazole (PROTONIX) 40 MG EC tablet, TAKE ONE TABLET BY MOUTH DAILY AT 9AM, Disp: , Rfl:     pramipexole (MIRAPEX) 0.25 MG tablet, TAKE 1/2 TABLET BY MOUTH AT BEDTIME FOR 1 WEEK THEN TAKE ONE TABLET BY MOUTH DAILY AT 9PM AT BEDTIME (vial), Disp: , Rfl:     pregabalin (LYRICA) 150 MG capsule, Take  1 capsule by mouth 2 (Two) Times a Day., Disp: 180 capsule, Rfl: 2    PROAIR  (90 Base) MCG/ACT inhaler, , Disp: , Rfl:     traMADol (ULTRAM) 50 MG tablet, Take 1 tablet by mouth Every 8 (Eight) Hours As Needed. for pain, Disp: , Rfl:     Current Facility-Administered Medications:     Hyaluronan (ORTHOVISC) injection 30 mg, 30 mg, Intra-articular, Weekly, Kaleb Teague MD, 30 mg at 09/24/20 1057    Hyaluronan (ORTHOVISC) injection 30 mg, 30 mg, Intra-articular, Weekly, Kaleb Teague MD, 30 mg at 09/24/20 1057    Hyaluronan (ORTHOVISC) injection 30 mg, 30 mg, Intra-articular, Weekly, Kaleb Teague MD, 30 mg at 10/01/20 1052    Hyaluronan (ORTHOVISC) injection 30 mg, 30 mg, Intra-articular, Weekly, Kaleb Teague MD, 30 mg at 10/01/20 1051    Hyaluronan (ORTHOVISC) injection 30 mg, 30 mg, Intra-articular, Weekly, Kaleb Teague MD, 30 mg at 10/08/20 1106    Hyaluronan (ORTHOVISC) injection 30 mg, 30 mg, Intra-articular, Weekly, Kaleb Teague MD, 30 mg at 10/08/20 1105    Review of Systems   Musculoskeletal:  Positive for arthralgias, back pain, gait problem and myalgias. Negative for joint swelling.   Neurological:  Negative for weakness and numbness.       Vitals:    12/27/23 1256   BP: 158/81   BP Location: Right arm   Patient Position: Sitting   Cuff Size: Adult   Pulse: 84   Resp: 16   SpO2: 99%   Weight: 129 kg (284 lb 8 oz)   PainSc:   3   PainLoc: Knee         Objective   Physical Exam  Vitals and nursing note reviewed.   Constitutional:       Appearance: She is well-developed.   Neck:      Trachea: No tracheal deviation.   Musculoskeletal:      Right knee: No bony tenderness. Decreased range of motion. Tenderness present.      Left knee: No bony tenderness. Decreased range of motion. Tenderness present.      Comments: Lumbar Spine Exam:  Tender to palpation over the lumbar paraspinal musculature No  Limited range of motion secondary to pain  yes  Facet loading positive: Bilateral  Facets tender to palpation: Bilateral  Straight leg raise test positive: Negative    SI Joint Exam:  David positive: Negative  PSIS tender: bilateral   SI joint palpation: bilateral  SI joint compression: Negative     Neurological:      General: No focal deficit present.      Mental Status: She is alert.      Sensory: No sensory deficit.      Motor: No weakness.           Assessment & Plan   Problems Addressed this Visit    None  Visit Diagnoses       Chronic pain of both knees    -  Primary    Spinal stenosis at L4-L5 level        Relevant Medications    pregabalin (LYRICA) 150 MG capsule          Diagnoses         Codes Comments    Chronic pain of both knees    -  Primary ICD-10-CM: M25.561, M25.562, G89.29  ICD-9-CM: 719.46, 338.29     Spinal stenosis at L4-L5 level     ICD-10-CM: M48.061  ICD-9-CM: 724.02             Plan:  Good benefit with her medial branch block and RFA.  Limited benefit with LESI  Continue Lyrica 150 mg twice daily  Good benefit with bilateral Zilretta  --- Follow-up as needed         INSPECT REPORT    As part of the patient's treatment plan, I may be prescribing controlled substances. The patient has been made aware of appropriate use of such medications, including potential risk of somnolence, limited ability to drive and/or work safely, and the potential for dependence or overdose. It has also bee made clear that these medications are for use by this patient only, without concomitant use of alcohol or other substances unless prescribed.     Patient has completed prescribing agreement detailing terms of continued prescribing of controlled substances, including monitoring AMA reports, urine drug screening, and pill counts if necessary. The patient is aware that inappropriate use will results in cessation of prescribing such medications.    INSPECT report has been reviewed and scanned into the patient's chart.    As the clinician, I personally  reviewed the INSPECT from 12/26/2023.    History and physical exam exhibit continued safe and appropriate use of controlled substances.      EMR Dragon/Transcription disclaimer:   Much of this encounter note is an electronic transcription/translation of spoken language to printed text. The electronic translation of spoken language may permit erroneous, or at times, nonsensical words or phrases to be inadvertently transcribed; Although I have reviewed the note for such errors, some may still exist.

## 2024-06-25 ENCOUNTER — OFFICE VISIT (OUTPATIENT)
Dept: PAIN MEDICINE | Facility: CLINIC | Age: 74
End: 2024-06-25
Payer: MEDICARE

## 2024-06-25 VITALS
RESPIRATION RATE: 16 BRPM | HEART RATE: 94 BPM | BODY MASS INDEX: 49.95 KG/M2 | SYSTOLIC BLOOD PRESSURE: 137 MMHG | OXYGEN SATURATION: 99 % | DIASTOLIC BLOOD PRESSURE: 62 MMHG | WEIGHT: 291 LBS

## 2024-06-25 DIAGNOSIS — M47.816 SPONDYLOSIS OF LUMBAR REGION WITHOUT MYELOPATHY OR RADICULOPATHY: Primary | ICD-10-CM

## 2024-06-25 DIAGNOSIS — M48.061 SPINAL STENOSIS AT L4-L5 LEVEL: ICD-10-CM

## 2024-06-25 PROCEDURE — 1160F RVW MEDS BY RX/DR IN RCRD: CPT | Performed by: STUDENT IN AN ORGANIZED HEALTH CARE EDUCATION/TRAINING PROGRAM

## 2024-06-25 PROCEDURE — 1125F AMNT PAIN NOTED PAIN PRSNT: CPT | Performed by: STUDENT IN AN ORGANIZED HEALTH CARE EDUCATION/TRAINING PROGRAM

## 2024-06-25 PROCEDURE — 1159F MED LIST DOCD IN RCRD: CPT | Performed by: STUDENT IN AN ORGANIZED HEALTH CARE EDUCATION/TRAINING PROGRAM

## 2024-06-25 PROCEDURE — 99214 OFFICE O/P EST MOD 30 MIN: CPT | Performed by: STUDENT IN AN ORGANIZED HEALTH CARE EDUCATION/TRAINING PROGRAM

## 2024-06-25 RX ORDER — BACLOFEN 10 MG/1
10 TABLET ORAL 3 TIMES DAILY
COMMUNITY

## 2024-06-25 RX ORDER — GABAPENTIN 800 MG/1
800 TABLET ORAL 3 TIMES DAILY
COMMUNITY

## 2024-06-25 NOTE — PROGRESS NOTES
CHIEF COMPLAINT  Chief Complaint   Patient presents with    Knee Pain     Hydrocodone LD 10:30am today    Back Pain       Primary Care  Emelina Lopez, RALEIGH Burroughs   Desire Short is a 73 y.o. female  who presents for chronic low back pain with bilateral lumbar radiculopathy.  She reports pain in the lower lumbar region which tends to radiate into the hips bilaterally.  She does have some focal tenderness across the SI joints bilaterally as well.  She describes pain especially with prolonged standing and walking which improves with leaning forward and resting.  She tends to have minimal pain with sitting and resting.  She is currently taking hydrocodone from her primary care provider but does not get significant pain relief with the medications.  She recently had an MRI which does show several areas of mild to moderate stenosis which could be contributing to her pain.  She denies any significant red flag symptoms such as loss of bowel or bladder or significant numbness or weakness.    Back Pain  Pertinent negatives include no numbness or weakness.   Knee Pain   Pertinent negatives include no numbness.        Location: Low back with radiation to the hips bilaterally  Onset: Years ago  Duration: Slowly worsening  Timing: Constant throughout the day  Quality: Aching, cramping  Severity: Today: 3       Last Week: 3       Worst: 7  Modifying Factors: The pain is worse with movement and physical activity and standing and slightly improved with sitting  Functional Deficit: The pain makes it difficult for her to perform her activities of daily living and stand for prolonged periods of time    Physical Therapy: no    Interval Update 06/25/2024: Here today requesting repeat RFA.  She reports  nearly 80% relief for 7 1/2 months.  Also requesting repeat knee injections.     The following portions of the patient's history were reviewed and updated as appropriate: allergies, current medications, past family history,  past medical history, past social history, past surgical history and problem list.    Procedures:  6/7/2023: Left knee injection with Zilretta: 100% relief  10/4/2023: Bilateral L4-5, L5-S1 lumbar RFA: 80% benefit  12/6/2023: Bilateral Zilretta injection: 90% benefit      Current Outpatient Medications:     albuterol sulfate  (90 Base) MCG/ACT inhaler, Inhale 2 puffs., Disp: , Rfl:     atorvastatin (LIPITOR) 20 MG tablet, Take 1 tablet by mouth Daily. 200001, Disp: , Rfl: 0    baclofen (LIORESAL) 10 MG tablet, Take 1 tablet by mouth 3 (Three) Times a Day., Disp: , Rfl:     Breo Ellipta 200-25 MCG/INH inhaler, Inhale 1 puff Daily., Disp: , Rfl:     celecoxib (CeleBREX) 200 MG capsule, CELEBREX 200 MG CAPS, Disp: , Rfl:     cetirizine (zyrTEC) 10 MG tablet, Take 1 tablet by mouth., Disp: , Rfl:     Cholecalciferol 1000 units tablet, Take 2 tablets by mouth Daily., Disp: , Rfl:     cyclobenzaprine (FLEXERIL) 10 MG tablet, TAKE 1 TABLET BY MOUTH THREE TIMES A DAY AS NEEDED FOR MUSCLE SPASM (FLEXERIL), Disp: , Rfl:     DULoxetine (CYMBALTA) 60 MG capsule, TAKE 1 CAPSULE BY MOUTH ONE TIME A DAY, Disp: , Rfl:     ferrous sulfate 325 (65 FE) MG EC tablet, Take 1 tablet by mouth daily with breakfast Take on empty stomach, with something acidic., Disp: , Rfl:     gabapentin (NEURONTIN) 800 MG tablet, Take 1 tablet by mouth 3 (Three) Times a Day., Disp: , Rfl:     HYDROcodone-acetaminophen (NORCO) 5-325 MG per tablet, TAKE 1 TABLET BY MOUTH 2 TIMES A DAY AS NEEDED FOR PAIN max daily amount 2 tablets, Disp: , Rfl:     lisinopril-hydrochlorothiazide (PRINZIDE,ZESTORETIC) 20-25 MG per tablet, Take 1 tablet by mouth Daily., Disp: , Rfl:     pantoprazole (PROTONIX) 40 MG EC tablet, TAKE ONE TABLET BY MOUTH DAILY AT 9AM, Disp: , Rfl:     pramipexole (MIRAPEX) 0.25 MG tablet, TAKE 1/2 TABLET BY MOUTH AT BEDTIME FOR 1 WEEK THEN TAKE ONE TABLET BY MOUTH DAILY AT 9PM AT BEDTIME (vial), Disp: , Rfl:     PROAIR  (90 Base)  MCG/ACT inhaler, , Disp: , Rfl:     cyclobenzaprine (FLEXERIL) 10 MG tablet, Take 1 tablet by mouth. (Patient not taking: Reported on 6/25/2024), Disp: , Rfl:     pregabalin (LYRICA) 150 MG capsule, Take 1 capsule by mouth 2 (Two) Times a Day. (Patient not taking: Reported on 6/25/2024), Disp: 180 capsule, Rfl: 2    traMADol (ULTRAM) 50 MG tablet, Take 1 tablet by mouth Every 8 (Eight) Hours As Needed. for pain (Patient not taking: Reported on 6/25/2024), Disp: , Rfl:     Current Facility-Administered Medications:     Hyaluronan (ORTHOVISC) injection 30 mg, 30 mg, Intra-articular, Weekly, Kaleb Teague MD, 30 mg at 09/24/20 1057    Hyaluronan (ORTHOVISC) injection 30 mg, 30 mg, Intra-articular, Weekly, Kaleb Teague MD, 30 mg at 09/24/20 1057    Hyaluronan (ORTHOVISC) injection 30 mg, 30 mg, Intra-articular, Weekly, Kaleb Teague MD, 30 mg at 10/01/20 1052    Hyaluronan (ORTHOVISC) injection 30 mg, 30 mg, Intra-articular, Weekly, Kaleb Teague MD, 30 mg at 10/01/20 1051    Hyaluronan (ORTHOVISC) injection 30 mg, 30 mg, Intra-articular, Weekly, Kaleb Teague MD, 30 mg at 10/08/20 1106    Hyaluronan (ORTHOVISC) injection 30 mg, 30 mg, Intra-articular, Weekly, Kaleb Teague MD, 30 mg at 10/08/20 1105    Review of Systems   Musculoskeletal:  Positive for arthralgias, back pain, gait problem and myalgias. Negative for joint swelling.   Neurological:  Negative for weakness and numbness.       Vitals:    06/25/24 1402   BP: 137/62   Pulse: 94   Resp: 16   SpO2: 99%   Weight: 132 kg (291 lb)   PainSc:   4         Objective   Physical Exam  Vitals and nursing note reviewed.   Constitutional:       Appearance: She is well-developed.   Neck:      Trachea: No tracheal deviation.   Musculoskeletal:      Right knee: No bony tenderness. Decreased range of motion. Tenderness present.      Left knee: No bony tenderness. Decreased range of motion. Tenderness present.       Comments: Lumbar Spine Exam:  Tender to palpation over the lumbar paraspinal musculature No  Limited range of motion secondary to pain yes  Facet loading positive: Bilateral  Facets tender to palpation: Bilateral  Straight leg raise test positive: Negative    SI Joint Exam:  David positive: Negative  PSIS tender: bilateral   SI joint palpation: bilateral  SI joint compression: Negative     Neurological:      General: No focal deficit present.      Mental Status: She is alert.      Sensory: No sensory deficit.      Motor: No weakness.           Assessment & Plan   Problems Addressed this Visit    None  Visit Diagnoses       Spondylosis of lumbar region without myelopathy or radiculopathy    -  Primary    Relevant Orders    Facet    Spinal stenosis at L4-L5 level              Diagnoses         Codes Comments    Spondylosis of lumbar region without myelopathy or radiculopathy    -  Primary ICD-10-CM: M47.816  ICD-9-CM: 721.3     Spinal stenosis at L4-L5 level     ICD-10-CM: M48.061  ICD-9-CM: 724.02             Plan:  Plan to repeat B/L L4/5, L5/S1 RFA with sedation  Limited benefit with LESI  Continue Lyrica 150 mg twice daily  Plan to repeat b/l Zilretta for chronic osteoarthritis   --- Follow-up next available for repeat repeat RFA with sedation         INSPECT REPORT    As part of the patient's treatment plan, I may be prescribing controlled substances. The patient has been made aware of appropriate use of such medications, including potential risk of somnolence, limited ability to drive and/or work safely, and the potential for dependence or overdose. It has also bee made clear that these medications are for use by this patient only, without concomitant use of alcohol or other substances unless prescribed.     Patient has completed prescribing agreement detailing terms of continued prescribing of controlled substances, including monitoring AMA reports, urine drug screening, and pill counts if necessary. The  patient is aware that inappropriate use will results in cessation of prescribing such medications.    INSPECT report has been reviewed and scanned into the patient's chart.    As the clinician, I personally reviewed the INSPECT from 6/24/24.    History and physical exam exhibit continued safe and appropriate use of controlled substances.      EMR Dragon/Transcription disclaimer:   Much of this encounter note is an electronic transcription/translation of spoken language to printed text. The electronic translation of spoken language may permit erroneous, or at times, nonsensical words or phrases to be inadvertently transcribed; Although I have reviewed the note for such errors, some may still exist.

## 2024-07-16 ENCOUNTER — TELEPHONE (OUTPATIENT)
Dept: PAIN MEDICINE | Facility: HOSPITAL | Age: 74
End: 2024-07-16
Payer: MEDICARE

## 2024-07-16 NOTE — TELEPHONE ENCOUNTER
Pre procedure call made, LMOM asking patient to arrive by 0715 and that a  is required. NPO status also reviewed.

## 2024-07-17 ENCOUNTER — HOSPITAL ENCOUNTER (OUTPATIENT)
Dept: PAIN MEDICINE | Facility: HOSPITAL | Age: 74
Discharge: HOME | End: 2024-07-17
Payer: MEDICARE

## 2024-07-17 VITALS
OXYGEN SATURATION: 100 % | TEMPERATURE: 96.8 F | SYSTOLIC BLOOD PRESSURE: 129 MMHG | DIASTOLIC BLOOD PRESSURE: 60 MMHG | BODY MASS INDEX: 47.97 KG/M2 | WEIGHT: 281 LBS | RESPIRATION RATE: 17 BRPM | HEIGHT: 64 IN | HEART RATE: 71 BPM

## 2024-07-17 DIAGNOSIS — M47.816 SPONDYLOSIS OF LUMBAR REGION WITHOUT MYELOPATHY OR RADICULOPATHY: Primary | ICD-10-CM

## 2024-07-17 DIAGNOSIS — R52 PAIN: ICD-10-CM

## 2024-07-17 PROCEDURE — 25010000002 METHYLPREDNISOLONE PER 40 MG: Performed by: STUDENT IN AN ORGANIZED HEALTH CARE EDUCATION/TRAINING PROGRAM

## 2024-07-17 PROCEDURE — 25010000002 FENTANYL CITRATE (PF) 50 MCG/ML SOLUTION

## 2024-07-17 PROCEDURE — 25010000002 MIDAZOLAM PER 1 MG

## 2024-07-17 PROCEDURE — 77003 FLUOROGUIDE FOR SPINE INJECT: CPT

## 2024-07-17 RX ORDER — LIDOCAINE HYDROCHLORIDE 20 MG/ML
5 INJECTION, SOLUTION EPIDURAL; INFILTRATION; INTRACAUDAL; PERINEURAL ONCE
Status: COMPLETED | OUTPATIENT
Start: 2024-07-17 | End: 2024-07-17

## 2024-07-17 RX ORDER — FENTANYL CITRATE 50 UG/ML
100 INJECTION, SOLUTION INTRAMUSCULAR; INTRAVENOUS
Status: DISCONTINUED | OUTPATIENT
Start: 2024-07-17 | End: 2024-07-18 | Stop reason: HOSPADM

## 2024-07-17 RX ORDER — FENTANYL CITRATE 50 UG/ML
INJECTION, SOLUTION INTRAMUSCULAR; INTRAVENOUS
Status: COMPLETED
Start: 2024-07-17 | End: 2024-07-17

## 2024-07-17 RX ORDER — MIDAZOLAM HYDROCHLORIDE 1 MG/ML
2 INJECTION INTRAMUSCULAR; INTRAVENOUS ONCE
Status: COMPLETED | OUTPATIENT
Start: 2024-07-17 | End: 2024-07-17

## 2024-07-17 RX ORDER — MIDAZOLAM HYDROCHLORIDE 1 MG/ML
INJECTION INTRAMUSCULAR; INTRAVENOUS
Status: COMPLETED
Start: 2024-07-17 | End: 2024-07-17

## 2024-07-17 RX ORDER — METHYLPREDNISOLONE ACETATE 40 MG/ML
40 INJECTION, SUSPENSION INTRA-ARTICULAR; INTRALESIONAL; INTRAMUSCULAR; SOFT TISSUE ONCE
Status: COMPLETED | OUTPATIENT
Start: 2024-07-17 | End: 2024-07-17

## 2024-07-17 RX ADMIN — MIDAZOLAM HYDROCHLORIDE 1 MG: 1 INJECTION INTRAMUSCULAR; INTRAVENOUS at 08:27

## 2024-07-17 RX ADMIN — LIDOCAINE HYDROCHLORIDE 5 ML: 20 INJECTION, SOLUTION EPIDURAL; INFILTRATION; INTRACAUDAL; PERINEURAL at 08:41

## 2024-07-17 RX ADMIN — MIDAZOLAM 1 MG: 1 INJECTION INTRAMUSCULAR; INTRAVENOUS at 08:27

## 2024-07-17 RX ADMIN — FENTANYL CITRATE 100 MCG: 50 INJECTION, SOLUTION INTRAMUSCULAR; INTRAVENOUS at 08:27

## 2024-07-17 RX ADMIN — METHYLPREDNISOLONE ACETATE 40 MG: 40 INJECTION, SUSPENSION INTRA-ARTICULAR; INTRALESIONAL; INTRAMUSCULAR; INTRASYNOVIAL; SOFT TISSUE at 08:57

## 2024-07-17 NOTE — PROCEDURES
Bilateral L3-5 Lumbar Medial Branch RADIOFREQUENCY  [facility]      PREOPERATIVE DIAGNOSIS:  Lumbar spondylosis without myelopathy    POSTOPERATIVE DIAGNOSIS:  Lumbar spondylosis without myelopathy    PROCEDURE:   Diagnostic Bilateral Lumbar Medial Branch Nerve thermal radiofrequency lesioning, with fluoroscopy:  L3, L4, and L5 nerves (at the L4 and L5 transverse processes and the sacral alar groove) to thermally treat the innervation to facet joints L4-5 and L5-S1  76148-23 -- Bilateral L/S facet neuro destr., 1st Level  04477-58 -- Bilateral L/S facet neuro destr., 2nd  Level    PRE-PROCEDURE DISCUSSION WITH PATIENT:    Risks and complications were discussed with the patient prior to starting the procedure and informed consent was obtained.      SURGEON:  Chris Camarena MD    REASON FOR PROCEDURE:    The patient admits to 80% or more pain relief diagnostically from medial branch blockades., Increased back pain on range of motion exams, Pain on extension of the lumbar spine, and Positive lumbar facet loading maneuver    SEDATION:  Fentanyl 100 mcg IV and Midazolam 1mg  TIME OF PROCEDURE:   The intraoperative procedure time after administration of the sedative was 30 minutes.     TO: 0827  Proc End: 0857    ANESTHETIC:  Lidocaine 2%  STEROID:  Methylprednisolone (DEPO MEDROL) 40mg/ml      DESCRIPTON OF PROCEDURE:  After obtaining informed consent, IV access  was obtained in the preoperative area.   The patient was taken to the operating room.  The patient was placed in the prone position with a pillow under the abdomen. All pressure points were well padded.  EKG, blood pressure, and pulse oximeter were monitored.  The patient was monitored and sedated by the RN under my direction. The lumbosacral area was prepped with Chloraprep and draped in a sterile fashion.     Under fluoroscopic guidance the transverse processes of the L4 and L5 vertebrae at the junctions of the superior articular processes were  identified on the right.  Also identified was the groove between the ala and the superior articular process of the sacrum on the ipsilateral side.  Skin and subcutaneous tissue were anesthetized with 1ml of 1% lidocaine above each of these points. Then, radiofrequency probe needles were advanced in this fluoro view to the above junctions.  Aspiration was negative for blood and CSF.  After confirming the position of the needle with fluoroscope in all views, testing was initiated.  Motor testing was confirmed to be negative at 3V and 2Hz for any radicular stimulation.  Then 1mL of the local anesthetic was instilled in each needle.  Two minutes elapsed, and during this time a lateral fluoroscopic view was confirmed again to ensure the needles had not advanced nor retracted.  Then, Radiofrequency Lesioning was initiated for 2.5 minutes at 80 degrees Celsius, rotating the needle tips 90 degrees 1/2 way through.  Needles were removed intact from each of the areas.     A similar procedure was repeated to address the L3, L4, and L5 nerves on the contralateral side.   Onset of analgesia was noted.  Vital signs remained stable throughout.      ESTIMATED BLOOD LOSS:  <5 mL  SPECIMENS:  none    COMPLICATIONS:   No complications were noted.    TOLERANCE & DISCHARGE CONDITION:    The patient tolerated the procedure well.  The patient was transported to the recovery area without difficulties.  The patient was discharged to home under the care of family in stable and satisfactory condition.    PLAN OF CARE:  The patient was given our standard instruction sheet.  The patient will  Return to clinic in 8 wks.  The patient will resume all medications as per the medication reconciliation sheet.

## 2024-07-17 NOTE — DISCHARGE INSTRUCTIONS
[facility]  [address] [place] [id number]  Phone: [phone]                                    After Visit Summary   7/26/2021    Nidhi Miguel    MRN: [id number]           After Visit Summary Signature Page    I have received my discharge instructions, and my questions have been answered. I have discussed any challenges I see with this plan with the nurse or doctor.    ..........................................................................................................................................  Patient/Patient Representative Signature      ..........................................................................................................................................  Patient Representative Print Name and Relationship to Patient    ..................................................               ................................................  Date                                   Time    ..........................................................................................................................................  Reviewed by Signature/Title    ...................................................              ..............................................  Date                                               Time          22EPIC Rev 08/18        Radiofrequency Lesioning, Care After    Refer to this sheet in the next few weeks. These instructions provide you with information about caring for yourself after your procedure. Your health care provider may also give you more specific instructions. Your treatment has been planned according to current medical practices, but problems sometimes occur. Call your health care provider if you have any problems or questions after your procedure.  What can I expect after the procedure?  After the procedure, it is common to have:  Pain from the burned nerve.  You may feel a burning sensation for up to 1-2 weeks after the procedure  Temporary numbness at the site  Your leg/legs may be weak or feel numb after the procedure until the numbing medication wears off.  When you are up and walking, have assistance to prevent falling.     Follow these instructions at home:  Take over-the-counter and prescription medicines only as told by your health care provider.  Return to your normal activities as told by your health care provider. Ask your health care provider what activities are safe for you.  Pay close attention to how you feel after the procedure. If you start to have pain, write down when it hurts and how it feels. This will help you and your health care provider to know if you need an additional treatment.  Check your needle insertion site every day for signs of infection. Watch for:  Redness, swelling, or pain.  Fluid, blood, or pus.  Keep all follow-up visits as told by your health care provider. This is important.  No driving for 24 hrs-make sure you have full sensation in your legs prior to driving.  Avoid using heat on the injection site for 24 hours. You may use ice intermittently if needed by placing a               towel between your skin and the ice bag and using the ice for 20 minutes 2-3 times a day.  Do not take baths, swim or use a hot tub for 24 hours.  Leave your band-aids on for 24 hours and keep them  dry.    Contact a health care provider if:  Your pain does not get better.  You have redness, swelling, or pain at the needle insertion site.  You have fluid, blood, or pus coming from the needle insertion site.  You have a fever over 101 degrees.  You have new numb.ness in your arm or leg after the procedure    Get help right away if:  You develop sudden, severe pain.  You develop numbness or tingling near the procedure site that does not go away.  This information is not intended to replace advice given to you by your health care provider. Make sure you discuss any questions you have with your health care provider.  Document Released: 08/16/2012 Document Revised: 05/25/2017 Document Reviewed: 01/25/2016  [employer] Interactive Patient Education © 2019 [employer]  Moderate Conscious Sedation, Adult, Care After    This sheet gives you information about how to care for yourself after your procedure. Your health care provider may also give you more specific instructions. If you have problems or questions, contact your health care provider.    What can I expect after the procedure?  After the procedure, it is common to have:  Sleepiness for several hours.  Impaired judgment for several hours.  Difficulty with balance.  Vomiting if you eat too soon.    Follow these instructions at home:  For the next 24 hours:         Rest.  Do not participate in activities where you could fall or become injured.  Do not drive or use machinery.  Do not drink alcohol.  Do not take sleeping pills or medicines that cause drowsiness.  Do not make important decisions or sign legal documents.  Do not take care of children on your own.    Eating and drinking    Follow the diet recommended by your health care provider.  Drink enough fluid to keep your urine pale yellow.  If you vomit:  Drink water, juice, or soup when you can drink without vomiting.  Make sure you have little or no nausea before eating solid foods.     General  instructions  Take over-the-counter and prescription medicines only as told by your health care provider.  Have a responsible adult stay with you for 24 hours. It is important to have someone help care for you until you are awake and alert.  Do not smoke.  Keep all follow-up visits as told by your health care provider. This is important.    Contact a health care provider if:  You are still sleepy or having trouble with balance after 24 hours.  You feel light-headed.  You keep feeling nauseous or you keep vomiting.  You develop a rash.  You have a fever.  You have redness or swelling around the IV site.    Get help right away if:  You have trouble breathing.  You have new-onset confusion at home.    Summary  After the procedure, it is common to feel sleepy, have impaired judgment, or feel nauseous if you eat too soon.  Rest after you get home. Know the things you should not do after the procedure.  Follow the diet recommended by your health care provider and drink enough fluid to keep your urine pale yellow.  Get help right away if you have trouble breathing or new-onset confusion at home.    This information is not intended to replace advice given to you by your health care provider. Make sure you discuss any questions you have with your health care provider.    Document Revised: 04/16/2021 Document Reviewed: 11/12/2020  [employer]vier Patient Education © 2021 [employer]

## 2024-07-17 NOTE — ADDENDUM NOTE
Encounter addended by: Augustina Berman RN on: 7/17/2024 2:14 PM   Actions taken: LDA removed, Flowsheet accepted

## 2024-07-18 ENCOUNTER — TELEPHONE (OUTPATIENT)
Dept: PAIN MEDICINE | Facility: HOSPITAL | Age: 74
End: 2024-07-18
Payer: MEDICARE

## 2024-07-18 DIAGNOSIS — M25.562 CHRONIC PAIN OF BOTH KNEES: Primary | ICD-10-CM

## 2024-07-18 DIAGNOSIS — G89.29 CHRONIC PAIN OF BOTH KNEES: Primary | ICD-10-CM

## 2024-07-18 DIAGNOSIS — M25.561 CHRONIC PAIN OF BOTH KNEES: Primary | ICD-10-CM

## 2024-08-07 ENCOUNTER — HOSPITAL ENCOUNTER (OUTPATIENT)
Dept: PAIN MEDICINE | Facility: HOSPITAL | Age: 74
Discharge: HOME | End: 2024-08-07
Payer: MEDICARE

## 2024-08-07 VITALS
SYSTOLIC BLOOD PRESSURE: 165 MMHG | HEART RATE: 87 BPM | OXYGEN SATURATION: 97 % | WEIGHT: 281 LBS | DIASTOLIC BLOOD PRESSURE: 82 MMHG | TEMPERATURE: 97.1 F | BODY MASS INDEX: 47.97 KG/M2 | RESPIRATION RATE: 16 BRPM | HEIGHT: 64 IN

## 2024-08-07 DIAGNOSIS — M25.561 CHRONIC PAIN OF BOTH KNEES: Primary | ICD-10-CM

## 2024-08-07 DIAGNOSIS — M25.562 CHRONIC PAIN OF BOTH KNEES: Primary | ICD-10-CM

## 2024-08-07 DIAGNOSIS — G89.29 CHRONIC PAIN OF BOTH KNEES: Primary | ICD-10-CM

## 2024-08-07 DIAGNOSIS — R52 PAIN: ICD-10-CM

## 2024-08-07 PROCEDURE — 77002 NEEDLE LOCALIZATION BY XRAY: CPT | Performed by: STUDENT IN AN ORGANIZED HEALTH CARE EDUCATION/TRAINING PROGRAM

## 2024-08-07 PROCEDURE — 25010000002 TRIAMCINOLONE ACETONIDE 32 MG SUSPENSION RECONSTITUTED ER: Performed by: STUDENT IN AN ORGANIZED HEALTH CARE EDUCATION/TRAINING PROGRAM

## 2024-08-07 PROCEDURE — 20610 DRAIN/INJ JOINT/BURSA W/O US: CPT | Performed by: STUDENT IN AN ORGANIZED HEALTH CARE EDUCATION/TRAINING PROGRAM

## 2024-08-07 PROCEDURE — 77003 FLUOROGUIDE FOR SPINE INJECT: CPT

## 2024-08-07 PROCEDURE — 25510000001 IOPAMIDOL 41 % SOLUTION: Performed by: STUDENT IN AN ORGANIZED HEALTH CARE EDUCATION/TRAINING PROGRAM

## 2024-08-07 RX ORDER — IOPAMIDOL 408 MG/ML
3 INJECTION, SOLUTION INTRATHECAL
Status: COMPLETED | OUTPATIENT
Start: 2024-08-07 | End: 2024-08-07

## 2024-08-07 RX ADMIN — TRIAMCINOLONE ACETONIDE EXTENDED-RELEASE INJECTABLE SUSPENSION 32 MG: KIT INTRA-ARTICULAR at 09:40

## 2024-08-07 RX ADMIN — IOPAMIDOL 3 ML: 408 INJECTION, SOLUTION INTRATHECAL at 09:40

## 2024-08-07 NOTE — PROCEDURES
Bilateral Knee Intraarticular injections under fluoroscopic guidance  [facility]      PREOPERATIVE DIAGNOSIS:  Chronic, severe, knee osteoarthritis, knee pain    POSTOPERATIVE DIAGNOSIS:  Same as preop diagnosis    PROCEDURE:   Bilateral Knee Intraarticular injections under fluoroscopic guidance      PRE-PROCEDURE DISCUSSION WITH PATIENT:    Risks and complications were discussed with the patient prior to starting the procedure and informed consent was obtained.  We discussed various topics including but not limited to bleeding, infection, injury, nerve injury, paralysis, coma, death, postprocedural painful flare-up, postprocedural site soreness, and a lack of pain relief.        SURGEON:  Chris Camarena MD    REASON FOR PROCEDURE:    Chronic knee pain, osteoarhtritis.  Because of this, I felt that fluoroscopic assistance was necessary to confirm that we entered the joint space.      SEDATION:  Patient declined administration of moderate sedation    ANESTHETIC:  NONE  STEROID:   Zilretta 32mg        DESCRIPTON OF PROCEDURE:    After obtaining informed consent, an I.V. was not started in the preoperative area. The patient taken to the operating room and was placed in the supine position.  All pressure points were well padded.  The appropriate area was prepped with Chloraprep and draped in a sterile fashion.     The above indicated solution was prepared and the area over the entirety of the knee was prepared with a wide surgical prep of chlorhexidine solution.    On the LEFT KNEE, a point on the lateral aspect of the joint line was identified to enter into the joint space, and the sterile needle was inserted parallel with a slight posterior angle to enter easily into the joint space.  Aspiration was negative.   Contrast dye was injected and a proper arthrogram was seen.   Then, slowly, the aforementioned solution was injected easily into the joint space.     On the RIGHT KNEE, a point on the lateral aspect of  the joint line was identified to enter into the joint space, and the sterile needle was inserted parallel with a slight posterior angle to enter easily into the joint space.  Aspiration was negative.   Contrast dye was injected and a proper arthrogram was seen.   Then, slowly, the aforementioned solution was injected easily into the joint space.       The needle was removed intact from each site and bleeding was minimal. The insertion site was dressed with a Band-Aid.        ESTIMATED BLOOD LOSS:  <5 mL  SPECIMENS:  none      COMPLICATIONS:   No complications were noted. and There was no indication of vascular uptake on live injection of contrast dye.      TOLERANCE & DISCHARGE CONDITION:    The patient tolerated the procedure well.  The patient was transported to the recovery area without difficulties.  The patient was discharged to home under the care of family in stable and satisfactory condition.      PLAN OF CARE:  The patient was given our standard instruction sheet.  The patient will Return to clinic 6 wks.  The patient will resume all medications as per the medication reconciliation sheet.

## 2024-08-08 ENCOUNTER — TELEPHONE (OUTPATIENT)
Dept: PAIN MEDICINE | Facility: HOSPITAL | Age: 74
End: 2024-08-08
Payer: MEDICARE

## 2024-09-09 ENCOUNTER — OFFICE VISIT (OUTPATIENT)
Dept: PAIN MEDICINE | Facility: CLINIC | Age: 74
End: 2024-09-09
Payer: MEDICARE

## 2024-09-09 VITALS
OXYGEN SATURATION: 97 % | SYSTOLIC BLOOD PRESSURE: 156 MMHG | DIASTOLIC BLOOD PRESSURE: 96 MMHG | BODY MASS INDEX: 49.61 KG/M2 | HEART RATE: 78 BPM | WEIGHT: 289 LBS | RESPIRATION RATE: 16 BRPM

## 2024-09-09 DIAGNOSIS — G89.29 CHRONIC PAIN OF BOTH KNEES: Primary | ICD-10-CM

## 2024-09-09 DIAGNOSIS — M47.816 SPONDYLOSIS OF LUMBAR REGION WITHOUT MYELOPATHY OR RADICULOPATHY: ICD-10-CM

## 2024-09-09 DIAGNOSIS — M25.561 CHRONIC PAIN OF BOTH KNEES: Primary | ICD-10-CM

## 2024-09-09 DIAGNOSIS — M25.562 CHRONIC PAIN OF BOTH KNEES: Primary | ICD-10-CM

## 2024-09-09 PROCEDURE — 1160F RVW MEDS BY RX/DR IN RCRD: CPT | Performed by: STUDENT IN AN ORGANIZED HEALTH CARE EDUCATION/TRAINING PROGRAM

## 2024-09-09 PROCEDURE — 1159F MED LIST DOCD IN RCRD: CPT | Performed by: STUDENT IN AN ORGANIZED HEALTH CARE EDUCATION/TRAINING PROGRAM

## 2024-09-09 PROCEDURE — 99213 OFFICE O/P EST LOW 20 MIN: CPT | Performed by: STUDENT IN AN ORGANIZED HEALTH CARE EDUCATION/TRAINING PROGRAM

## 2024-09-09 PROCEDURE — 1125F AMNT PAIN NOTED PAIN PRSNT: CPT | Performed by: STUDENT IN AN ORGANIZED HEALTH CARE EDUCATION/TRAINING PROGRAM

## 2024-09-09 NOTE — PROGRESS NOTES
CHIEF COMPLAINT  Chief Complaint   Patient presents with    Back Pain     Hydrocodone LD 2p 9-8-24       Primary Care  Emelina Lopez APRN Subjective   Desire Short is a 73 y.o. female  who presents for chronic low back pain with bilateral lumbar radiculopathy.  She reports pain in the lower lumbar region which tends to radiate into the hips bilaterally.  She does have some focal tenderness across the SI joints bilaterally as well.  She describes pain especially with prolonged standing and walking which improves with leaning forward and resting.  She tends to have minimal pain with sitting and resting.  She is currently taking hydrocodone from her primary care provider but does not get significant pain relief with the medications.  She recently had an MRI which does show several areas of mild to moderate stenosis which could be contributing to her pain.  She denies any significant red flag symptoms such as loss of bowel or bladder or significant numbness or weakness.    Back Pain  Pertinent negatives include no numbness or weakness.   Knee Pain   Pertinent negatives include no numbness.        Location: Low back with radiation to the hips bilaterally  Onset: Years ago  Duration: Slowly worsening  Timing: Constant throughout the day  Quality: Aching, cramping  Severity: Today: 3       Last Week: 3       Worst: 7  Modifying Factors: The pain is worse with movement and physical activity and standing and slightly improved with sitting  Functional Deficit: The pain makes it difficult for her to perform her activities of daily living and stand for prolonged periods of time    Physical Therapy: no    Interval Update 09/09/2024: She is now status post both repeat RFA as well as bilateral knee injection.  Reports doing very well.  Minimal pain.    The following portions of the patient's history were reviewed and updated as appropriate: allergies, current medications, past family history, past medical history, past  social history, past surgical history and problem list.    Procedures:  6/7/2023: Left knee injection with Zilretta: 100% relief  10/4/2023: Bilateral L4-5, L5-S1 lumbar RFA: 80% benefit  12/6/2023: Bilateral Zilretta injection: 90% benefit      Current Outpatient Medications:     albuterol sulfate  (90 Base) MCG/ACT inhaler, Inhale 2 puffs., Disp: , Rfl:     atorvastatin (LIPITOR) 20 MG tablet, Take 1 tablet by mouth Daily. 200001, Disp: , Rfl: 0    baclofen (LIORESAL) 10 MG tablet, Take 1 tablet by mouth 3 (Three) Times a Day., Disp: , Rfl:     Breo Ellipta 200-25 MCG/INH inhaler, Inhale 1 puff Daily., Disp: , Rfl:     celecoxib (CeleBREX) 200 MG capsule, CELEBREX 200 MG CAPS, Disp: , Rfl:     cetirizine (zyrTEC) 10 MG tablet, Take 1 tablet by mouth., Disp: , Rfl:     Cholecalciferol 1000 units tablet, Take 2 tablets by mouth Daily., Disp: , Rfl:     cyclobenzaprine (FLEXERIL) 10 MG tablet, TAKE 1 TABLET BY MOUTH THREE TIMES A DAY AS NEEDED FOR MUSCLE SPASM (FLEXERIL), Disp: , Rfl:     cyclobenzaprine (FLEXERIL) 10 MG tablet, Take 1 tablet by mouth., Disp: , Rfl:     DULoxetine (CYMBALTA) 60 MG capsule, TAKE 1 CAPSULE BY MOUTH ONE TIME A DAY, Disp: , Rfl:     ferrous sulfate 325 (65 FE) MG EC tablet, Take 1 tablet by mouth daily with breakfast Take on empty stomach, with something acidic., Disp: , Rfl:     gabapentin (NEURONTIN) 800 MG tablet, Take 1 tablet by mouth 3 (Three) Times a Day., Disp: , Rfl:     HYDROcodone-acetaminophen (NORCO) 5-325 MG per tablet, TAKE 1 TABLET BY MOUTH 2 TIMES A DAY AS NEEDED FOR PAIN max daily amount 2 tablets, Disp: , Rfl:     lisinopril-hydrochlorothiazide (PRINZIDE,ZESTORETIC) 20-25 MG per tablet, Take 1 tablet by mouth Daily., Disp: , Rfl:     pantoprazole (PROTONIX) 40 MG EC tablet, TAKE ONE TABLET BY MOUTH DAILY AT 9AM, Disp: , Rfl:     pramipexole (MIRAPEX) 0.25 MG tablet, TAKE 1/2 TABLET BY MOUTH AT BEDTIME FOR 1 WEEK THEN TAKE ONE TABLET BY MOUTH DAILY AT 9PM AT  BEDTIME (vial), Disp: , Rfl:     pregabalin (LYRICA) 150 MG capsule, Take 1 capsule by mouth 2 (Two) Times a Day., Disp: 180 capsule, Rfl: 2    PROAIR  (90 Base) MCG/ACT inhaler, , Disp: , Rfl:     traMADol (ULTRAM) 50 MG tablet, Take 1 tablet by mouth Every 8 (Eight) Hours As Needed. for pain (Patient not taking: Reported on 9/9/2024), Disp: , Rfl:     Current Facility-Administered Medications:     Hyaluronan (ORTHOVISC) injection 30 mg, 30 mg, Intra-articular, Weekly, Kaleb Teague MD, 30 mg at 09/24/20 1057    Hyaluronan (ORTHOVISC) injection 30 mg, 30 mg, Intra-articular, Weekly, Kaleb Teague MD, 30 mg at 09/24/20 1057    Hyaluronan (ORTHOVISC) injection 30 mg, 30 mg, Intra-articular, Weekly, Kaleb Teague MD, 30 mg at 10/01/20 1052    Hyaluronan (ORTHOVISC) injection 30 mg, 30 mg, Intra-articular, Weekly, Kaleb Teague MD, 30 mg at 10/01/20 1051    Hyaluronan (ORTHOVISC) injection 30 mg, 30 mg, Intra-articular, Weekly, Kaleb Teague MD, 30 mg at 10/08/20 1106    Hyaluronan (ORTHOVISC) injection 30 mg, 30 mg, Intra-articular, Weekly, Kaleb Teague MD, 30 mg at 10/08/20 1105    Review of Systems   Musculoskeletal:  Positive for arthralgias, back pain, gait problem and myalgias. Negative for joint swelling.   Neurological:  Negative for weakness and numbness.       Vitals:    09/09/24 0811   BP: 156/96  Comment: R wrist   Pulse: 78   Resp: 16   SpO2: 97%   Weight: 131 kg (289 lb)   PainSc:   2         Objective   Physical Exam  Vitals and nursing note reviewed.   Constitutional:       Appearance: She is well-developed.   Neck:      Trachea: No tracheal deviation.   Musculoskeletal:      Right knee: No bony tenderness. Decreased range of motion. Tenderness present.      Left knee: No bony tenderness. Decreased range of motion. Tenderness present.      Comments: Lumbar Spine Exam:  Tender to palpation over the lumbar paraspinal musculature  No  Limited range of motion secondary to pain yes  Facet loading positive: Bilateral  Facets tender to palpation: Bilateral  Straight leg raise test positive: Negative    SI Joint Exam:  David positive: Negative  PSIS tender: bilateral   SI joint palpation: bilateral  SI joint compression: Negative     Neurological:      General: No focal deficit present.      Mental Status: She is alert.      Sensory: No sensory deficit.      Motor: No weakness.           Assessment & Plan   Problems Addressed this Visit    None  Visit Diagnoses       Chronic pain of both knees    -  Primary    Relevant Orders    Large Joint Arthrocentesis    Spondylosis of lumbar region without myelopathy or radiculopathy              Diagnoses         Codes Comments    Chronic pain of both knees    -  Primary ICD-10-CM: M25.561, M25.562, G89.29  ICD-9-CM: 719.46, 338.29     Spondylosis of lumbar region without myelopathy or radiculopathy     ICD-10-CM: M47.816  ICD-9-CM: 721.3             Plan:  Excellent benefit with bilateral repeat RFA  She again did excellent with her knee injections.  She reports that she is moving in several months and wishes to have any injection right before the holidays to help her get through the holidays.  This is reasonable.  Will plan for repeat Zilretta injection in approximately 3 and half months  --- Follow-up 3-1/2 months for repeat knee injections         INSPECT REPORT    As part of the patient's treatment plan, I may be prescribing controlled substances. The patient has been made aware of appropriate use of such medications, including potential risk of somnolence, limited ability to drive and/or work safely, and the potential for dependence or overdose. It has also bee made clear that these medications are for use by this patient only, without concomitant use of alcohol or other substances unless prescribed.     Patient has completed prescribing agreement detailing terms of continued prescribing of controlled  substances, including monitoring AMA reports, urine drug screening, and pill counts if necessary. The patient is aware that inappropriate use will results in cessation of prescribing such medications.    INSPECT report has been reviewed and scanned into the patient's chart.    As the clinician, I personally reviewed the INSPECT from 6/9/2024.    History and physical exam exhibit continued safe and appropriate use of controlled substances.      EMR Dragon/Transcription disclaimer:   Much of this encounter note is an electronic transcription/translation of spoken language to printed text. The electronic translation of spoken language may permit erroneous, or at times, nonsensical words or phrases to be inadvertently transcribed; Although I have reviewed the note for such errors, some may still exist.

## 2024-10-04 RX ORDER — METHYLPREDNISOLONE 4 MG
TABLET, DOSE PACK ORAL
Qty: 21 TABLET | Refills: 0 | Status: SHIPPED | OUTPATIENT
Start: 2024-10-04

## 2024-11-20 ENCOUNTER — HOSPITAL ENCOUNTER (OUTPATIENT)
Dept: PAIN MEDICINE | Facility: HOSPITAL | Age: 74
Discharge: HOME | End: 2024-11-20
Payer: MEDICARE

## 2024-11-20 VITALS
RESPIRATION RATE: 16 BRPM | DIASTOLIC BLOOD PRESSURE: 79 MMHG | HEART RATE: 75 BPM | TEMPERATURE: 97.1 F | OXYGEN SATURATION: 99 % | BODY MASS INDEX: 49.34 KG/M2 | WEIGHT: 289 LBS | HEIGHT: 64 IN | SYSTOLIC BLOOD PRESSURE: 154 MMHG

## 2024-11-20 DIAGNOSIS — G89.29 CHRONIC PAIN OF BOTH KNEES: Primary | ICD-10-CM

## 2024-11-20 DIAGNOSIS — R52 PAIN: ICD-10-CM

## 2024-11-20 DIAGNOSIS — M25.562 CHRONIC PAIN OF BOTH KNEES: Primary | ICD-10-CM

## 2024-11-20 DIAGNOSIS — M25.561 CHRONIC PAIN OF BOTH KNEES: Primary | ICD-10-CM

## 2024-11-20 PROCEDURE — 25010000002 TRIAMCINOLONE ACETONIDE 32 MG SUSPENSION RECONSTITUTED ER

## 2024-11-20 PROCEDURE — 77003 FLUOROGUIDE FOR SPINE INJECT: CPT

## 2024-11-20 PROCEDURE — 25010000002 TRIAMCINOLONE ACETONIDE 32 MG SUSPENSION RECONSTITUTED ER: Performed by: STUDENT IN AN ORGANIZED HEALTH CARE EDUCATION/TRAINING PROGRAM

## 2024-11-20 PROCEDURE — 25510000001 IOPAMIDOL 41 % SOLUTION: Performed by: STUDENT IN AN ORGANIZED HEALTH CARE EDUCATION/TRAINING PROGRAM

## 2024-11-20 RX ORDER — HYDROCODONE BITARTRATE AND ACETAMINOPHEN 7.5; 325 MG/15ML; MG/15ML
SOLUTION ORAL EVERY 8 HOURS PRN
COMMUNITY

## 2024-11-20 RX ORDER — IOPAMIDOL 408 MG/ML
3 INJECTION, SOLUTION INTRATHECAL
Status: COMPLETED | OUTPATIENT
Start: 2024-11-20 | End: 2024-11-20

## 2024-11-20 RX ORDER — BUSPIRONE HYDROCHLORIDE 5 MG/1
5 TABLET ORAL 2 TIMES DAILY
COMMUNITY

## 2024-11-20 RX ADMIN — TRIAMCINOLONE ACETONIDE EXTENDED-RELEASE INJECTABLE SUSPENSION 32 MG: KIT INTRA-ARTICULAR at 11:24

## 2024-11-20 RX ADMIN — TRIAMCINOLONE ACETONIDE EXTENDED-RELEASE INJECTABLE SUSPENSION 32 MG: KIT INTRA-ARTICULAR at 11:22

## 2024-11-20 RX ADMIN — IOPAMIDOL 3 ML: 408 INJECTION, SOLUTION INTRATHECAL at 11:21

## 2024-11-20 NOTE — PROCEDURES
Bilateral Knee Intraarticular injections under fluoroscopic guidance  [facility]      PREOPERATIVE DIAGNOSIS:  Chronic, severe, knee osteoarthritis, knee pain    POSTOPERATIVE DIAGNOSIS:  Same as preop diagnosis    PROCEDURE:   Bilateral Knee Intraarticular injections under fluoroscopic guidance      PRE-PROCEDURE DISCUSSION WITH PATIENT:    Risks and complications were discussed with the patient prior to starting the procedure and informed consent was obtained.  We discussed various topics including but not limited to bleeding, infection, injury, nerve injury, paralysis, coma, death, postprocedural painful flare-up, postprocedural site soreness, and a lack of pain relief.        SURGEON:  Chris Camarena MD    REASON FOR PROCEDURE:    Chronic knee pain, osteoarhtritis.  Because of this, I felt that fluoroscopic assistance was necessary to confirm that we entered the joint space.      SEDATION:  Patient declined administration of moderate sedation    ANESTHETIC:  NONE  STEROID:   Zilretta 32mg        DESCRIPTON OF PROCEDURE:    After obtaining informed consent, an I.V. was not started in the preoperative area. The patient taken to the operating room and was placed in the supine position.  All pressure points were well padded.  The appropriate area was prepped with Chloraprep and draped in a sterile fashion.     The above indicated solution was prepared and the area over the entirety of the knee was prepared with a wide surgical prep of chlorhexidine solution.    On the LEFT KNEE, a point on the lateral aspect of the joint line was identified to enter into the joint space, and the sterile needle was inserted parallel with a slight posterior angle to enter easily into the joint space.  Aspiration was negative.   Contrast dye was injected and a proper arthrogram was seen.   Then, slowly, the aforementioned solution was injected easily into the joint space.     On the RIGHT KNEE, a point on the lateral aspect of  the joint line was identified to enter into the joint space, and the sterile needle was inserted parallel with a slight posterior angle to enter easily into the joint space.  Aspiration was negative.   Contrast dye was injected and a proper arthrogram was seen.   Then, slowly, the aforementioned solution was injected easily into the joint space.       The needle was removed intact from each site and bleeding was minimal. The insertion site was dressed with a Band-Aid.        ESTIMATED BLOOD LOSS:  <5 mL  SPECIMENS:  none      COMPLICATIONS:   No complications were noted. and There was no indication of vascular uptake on live injection of contrast dye.      TOLERANCE & DISCHARGE CONDITION:    The patient tolerated the procedure well.  The patient was transported to the recovery area without difficulties.  The patient was discharged to home under the care of family in stable and satisfactory condition.      PLAN OF CARE:  The patient was given our standard instruction sheet.  The patient will Return to clinic 6 wks.  The patient will resume all medications as per the medication reconciliation sheet.

## 2024-12-11 DIAGNOSIS — M47.816 SPONDYLOSIS OF LUMBAR REGION WITHOUT MYELOPATHY OR RADICULOPATHY: Primary | ICD-10-CM

## 2025-01-03 ENCOUNTER — TELEPHONE (OUTPATIENT)
Dept: PAIN MEDICINE | Facility: CLINIC | Age: 75
End: 2025-01-03
Payer: MEDICARE

## 2025-01-03 ENCOUNTER — TELEPHONE (OUTPATIENT)
Dept: PAIN MEDICINE | Facility: HOSPITAL | Age: 75
End: 2025-01-03
Payer: MEDICARE

## 2025-01-03 NOTE — TELEPHONE ENCOUNTER
Pre Procedure Phone call made to inform patient: Nothing to eat or drink after midnight or 8 hour prior, may take a sip of water to take morning medications, must have a  that can wait in the waiting room, do not wear anything with metal or jewelry, and must arrive 45 minutes prior to procedure time.  LMOM

## 2025-01-03 NOTE — TELEPHONE ENCOUNTER
Caller: Desire Short    Relationship to patient: Self    Best call back number:    Chief complaint: BACK    Type of visit: FUP PROCEDURE     Requested date: UNSURE CANCEL DUE TO WEATHER     If rescheduling, when is the original appointment: 8687

## 2025-01-14 ENCOUNTER — TELEPHONE (OUTPATIENT)
Dept: PAIN MEDICINE | Facility: HOSPITAL | Age: 75
End: 2025-01-14
Payer: MEDICARE

## 2025-01-14 NOTE — TELEPHONE ENCOUNTER
Pre Procedure Phone call made to inform patient: Nothing to eat or drink after midnight or 8 hour prior, may take a sip of water to take morning medications, must have a  that can wait in the waiting room, do not wear anything with metal or jewelry, and must arrive 45 minutes prior to procedure time.  LMOM making patient aware.

## 2025-01-15 ENCOUNTER — HOSPITAL ENCOUNTER (OUTPATIENT)
Dept: PAIN MEDICINE | Facility: HOSPITAL | Age: 75
Discharge: HOME | End: 2025-01-15
Payer: MEDICARE

## 2025-01-15 VITALS
SYSTOLIC BLOOD PRESSURE: 160 MMHG | HEART RATE: 82 BPM | TEMPERATURE: 97.1 F | OXYGEN SATURATION: 99 % | RESPIRATION RATE: 17 BRPM | HEIGHT: 64 IN | DIASTOLIC BLOOD PRESSURE: 56 MMHG | BODY MASS INDEX: 49.34 KG/M2 | WEIGHT: 289 LBS

## 2025-01-15 DIAGNOSIS — M47.816 SPONDYLOSIS OF LUMBAR REGION WITHOUT MYELOPATHY OR RADICULOPATHY: Primary | ICD-10-CM

## 2025-01-15 DIAGNOSIS — R52 PAIN: ICD-10-CM

## 2025-01-15 PROCEDURE — 99152 MOD SED SAME PHYS/QHP 5/>YRS: CPT | Performed by: STUDENT IN AN ORGANIZED HEALTH CARE EDUCATION/TRAINING PROGRAM

## 2025-01-15 PROCEDURE — 25010000002 METHYLPREDNISOLONE PER 40 MG: Performed by: STUDENT IN AN ORGANIZED HEALTH CARE EDUCATION/TRAINING PROGRAM

## 2025-01-15 PROCEDURE — 77003 FLUOROGUIDE FOR SPINE INJECT: CPT

## 2025-01-15 PROCEDURE — 64635 DESTROY LUMB/SAC FACET JNT: CPT | Performed by: STUDENT IN AN ORGANIZED HEALTH CARE EDUCATION/TRAINING PROGRAM

## 2025-01-15 PROCEDURE — 25010000002 MIDAZOLAM PER 1 MG

## 2025-01-15 PROCEDURE — 64636 DESTROY L/S FACET JNT ADDL: CPT | Performed by: STUDENT IN AN ORGANIZED HEALTH CARE EDUCATION/TRAINING PROGRAM

## 2025-01-15 PROCEDURE — 25010000002 LIDOCAINE PF 2% 2 % SOLUTION: Performed by: STUDENT IN AN ORGANIZED HEALTH CARE EDUCATION/TRAINING PROGRAM

## 2025-01-15 PROCEDURE — 25010000002 FENTANYL CITRATE (PF) 50 MCG/ML SOLUTION

## 2025-01-15 RX ORDER — LIDOCAINE HYDROCHLORIDE 20 MG/ML
5 INJECTION, SOLUTION EPIDURAL; INFILTRATION; INTRACAUDAL; PERINEURAL ONCE
Status: COMPLETED | OUTPATIENT
Start: 2025-01-15 | End: 2025-01-15

## 2025-01-15 RX ORDER — MIDAZOLAM HYDROCHLORIDE 1 MG/ML
INJECTION, SOLUTION INTRAMUSCULAR; INTRAVENOUS
Status: COMPLETED
Start: 2025-01-15 | End: 2025-01-15

## 2025-01-15 RX ORDER — MIDAZOLAM HYDROCHLORIDE 1 MG/ML
2 INJECTION, SOLUTION INTRAMUSCULAR; INTRAVENOUS ONCE
Status: COMPLETED | OUTPATIENT
Start: 2025-01-15 | End: 2025-01-15

## 2025-01-15 RX ORDER — FENTANYL CITRATE 50 UG/ML
100 INJECTION, SOLUTION INTRAMUSCULAR; INTRAVENOUS
Status: DISCONTINUED | OUTPATIENT
Start: 2025-01-15 | End: 2025-01-16 | Stop reason: HOSPADM

## 2025-01-15 RX ORDER — FENTANYL CITRATE 50 UG/ML
INJECTION, SOLUTION INTRAMUSCULAR; INTRAVENOUS
Status: COMPLETED
Start: 2025-01-15 | End: 2025-01-15

## 2025-01-15 RX ORDER — METHYLPREDNISOLONE ACETATE 40 MG/ML
40 INJECTION, SUSPENSION INTRA-ARTICULAR; INTRALESIONAL; INTRAMUSCULAR; SOFT TISSUE ONCE
Status: COMPLETED | OUTPATIENT
Start: 2025-01-15 | End: 2025-01-15

## 2025-01-15 RX ADMIN — FENTANYL CITRATE 100 MCG: 50 INJECTION, SOLUTION INTRAMUSCULAR; INTRAVENOUS at 10:18

## 2025-01-15 RX ADMIN — LIDOCAINE HYDROCHLORIDE 5 ML: 20 INJECTION, SOLUTION EPIDURAL; INFILTRATION; INTRACAUDAL; PERINEURAL at 10:25

## 2025-01-15 RX ADMIN — MIDAZOLAM HYDROCHLORIDE 1 MG: 1 INJECTION, SOLUTION INTRAMUSCULAR; INTRAVENOUS at 10:17

## 2025-01-15 RX ADMIN — MIDAZOLAM 1 MG: 1 INJECTION INTRAMUSCULAR; INTRAVENOUS at 10:17

## 2025-01-15 RX ADMIN — METHYLPREDNISOLONE ACETATE 40 MG: 40 INJECTION, SUSPENSION INTRA-ARTICULAR; INTRALESIONAL; INTRAMUSCULAR; INTRASYNOVIAL; SOFT TISSUE at 10:37

## 2025-01-15 NOTE — PROCEDURES
Bilateral L3-5 Lumbar Medial Branch RADIOFREQUENCY  [facility]      PREOPERATIVE DIAGNOSIS:  Lumbar spondylosis without myelopathy    POSTOPERATIVE DIAGNOSIS:  Lumbar spondylosis without myelopathy    PROCEDURE:   Diagnostic Bilateral Lumbar Medial Branch Nerve thermal radiofrequency lesioning, with fluoroscopy:  L3, L4, and L5 nerves (at the L4 and L5 transverse processes and the sacral alar groove) to thermally treat the innervation to facet joints L4-5 and L5-S1  26347-72 -- Bilateral L/S facet neuro destr., 1st Level  28474-24 -- Bilateral L/S facet neuro destr., 2nd  Level    PRE-PROCEDURE DISCUSSION WITH PATIENT:    Risks and complications were discussed with the patient prior to starting the procedure and informed consent was obtained.      SURGEON:  Chris Camarena MD    REASON FOR PROCEDURE:    The patient admits to 80% or more pain relief diagnostically from medial branch blockades., Increased back pain on range of motion exams, Pain on extension of the lumbar spine, and Positive lumbar facet loading maneuver    SEDATION:  Fentanyl 100 mcg IV and Midazolam 1mg  TIME OF PROCEDURE:   The intraoperative procedure time after administration of the sedative was 22 minutes.     TO: 1017  Proc End: 1039    ANESTHETIC:  Lidocaine 2%  STEROID:  Methylprednisolone (DEPO MEDROL) 40mg/ml      DESCRIPTON OF PROCEDURE:  After obtaining informed consent, IV access  was obtained in the preoperative area.   The patient was taken to the operating room.  The patient was placed in the prone position with a pillow under the abdomen. All pressure points were well padded.  EKG, blood pressure, and pulse oximeter were monitored.  The patient was monitored and sedated by the RN under my direction. The lumbosacral area was prepped with Chloraprep and draped in a sterile fashion.     Under fluoroscopic guidance the transverse processes of the L4 and L5 vertebrae at the junctions of the superior articular processes were  identified on the right.  Also identified was the groove between the ala and the superior articular process of the sacrum on the ipsilateral side.  Skin and subcutaneous tissue were anesthetized with 1ml of 1% lidocaine above each of these points. Then, radiofrequency probe needles were advanced in this fluoro view to the above junctions.  Aspiration was negative for blood and CSF.  After confirming the position of the needle with fluoroscope in all views, testing was initiated.  Motor testing was confirmed to be negative at 3V and 2Hz for any radicular stimulation.  Then 1mL of the local anesthetic was instilled in each needle.  Two minutes elapsed, and during this time a lateral fluoroscopic view was confirmed again to ensure the needles had not advanced nor retracted.  Then, Radiofrequency Lesioning was initiated for 2.5 minutes at 80 degrees Celsius, rotating the needle tips 90 degrees 1/2 way through.  Needles were removed intact from each of the areas.     A similar procedure was repeated to address the L3, L4, and L5 nerves on the contralateral side.   Onset of analgesia was noted.  Vital signs remained stable throughout.      ESTIMATED BLOOD LOSS:  <5 mL  SPECIMENS:  none    COMPLICATIONS:   No complications were noted.    TOLERANCE & DISCHARGE CONDITION:    The patient tolerated the procedure well.  The patient was transported to the recovery area without difficulties.  The patient was discharged to home under the care of family in stable and satisfactory condition.    PLAN OF CARE:  The patient was given our standard instruction sheet.  The patient will  Return to clinic in 8 wks.  The patient will resume all medications as per the medication reconciliation sheet.

## 2025-01-15 NOTE — DISCHARGE INSTRUCTIONS
Radiofrequency Lesioning, Care After    Refer to this sheet in the next few weeks. These instructions provide you with information about caring for yourself after your procedure. Your health care provider may also give you more specific instructions. Your treatment has been planned according to current medical practices, but problems sometimes occur. Call your health care provider if you have any problems or questions after your procedure.  What can I expect after the procedure?  After the procedure, it is common to have:  Pain from the burned nerve.  You may feel a burning sensation for up to 1-2 weeks after the procedure  Temporary numbness at the site  Your leg/legs may be weak or feel numb after the procedure until the numbing medication wears off.  When you are up and walking, have assistance to prevent falling.     Follow these instructions at home:  Take over-the-counter and prescription medicines only as told by your health care provider.  Return to your normal activities as told by your health care provider. Ask your health care provider what activities are safe for you.  Pay close attention to how you feel after the procedure. If you start to have pain, write down when it hurts and how it feels. This will help you and your health care provider to know if you need an additional treatment.  Check your needle insertion site every day for signs of infection. Watch for:  Redness, swelling, or pain.  Fluid, blood, or pus.  Keep all follow-up visits as told by your health care provider. This is important.  No driving for 24 hrs-make sure you have full sensation in your legs prior to driving.  Avoid using heat on the injection site for 24 hours. You may use ice intermittently if needed by placing a               towel between your skin and the ice bag and using the ice for 20 minutes 2-3 times a day.  Do not take baths, swim or use a hot tub for 24 hours.  Leave your band-aids on for 24 hours and keep them  dry.    Contact a health care provider if:  Your pain does not get better.  You have redness, swelling, or pain at the needle insertion site.  You have fluid, blood, or pus coming from the needle insertion site.  You have a fever over 101 degrees.  You have new numb.ness in your arm or leg after the procedure    Get help right away if:  You develop sudden, severe pain.  You develop numbness or tingling near the procedure site that does not go away.  This information is not intended to replace advice given to you by your health care provider. Make sure you discuss any questions you have with your health care provider.  Document Released: 08/16/2012 Document Revised: 05/25/2017 Document Reviewed: 01/25/2016  [employer] Patient Education © 2019 [employer]  Moderate Conscious Sedation, Adult, Care After    This sheet gives you information about how to care for yourself after your procedure. Your health care provider may also give you more specific instructions. If you have problems or questions, contact your health care provider.    What can I expect after the procedure?  After the procedure, it is common to have:  Sleepiness for several hours.  Impaired judgment for several hours.  Difficulty with balance.  Vomiting if you eat too soon.    Follow these instructions at home:  For the next 24 hours:         Rest.  Do not participate in activities where you could fall or become injured.  Do not drive or use machinery.  Do not drink alcohol.  Do not take sleeping pills or medicines that cause drowsiness.  Do not make important decisions or sign legal documents.  Do not take care of children on your own.    Eating and drinking    Follow the diet recommended by your health care provider.  Drink enough fluid to keep your urine pale yellow.  If you vomit:  Drink water, juice, or soup when you can drink without vomiting.  Make sure you have little or no nausea before eating solid foods.     General  instructions  Take over-the-counter and prescription medicines only as told by your health care provider.  Have a responsible adult stay with you for 24 hours. It is important to have someone help care for you until you are awake and alert.  Do not smoke.  Keep all follow-up visits as told by your health care provider. This is important.    Contact a health care provider if:  You are still sleepy or having trouble with balance after 24 hours.  You feel light-headed.  You keep feeling nauseous or you keep vomiting.  You develop a rash.  You have a fever.  You have redness or swelling around the IV site.    Get help right away if:  You have trouble breathing.  You have new-onset confusion at home.    Summary  After the procedure, it is common to feel sleepy, have impaired judgment, or feel nauseous if you eat too soon.  Rest after you get home. Know the things you should not do after the procedure.  Follow the diet recommended by your health care provider and drink enough fluid to keep your urine pale yellow.  Get help right away if you have trouble breathing or new-onset confusion at home.    This information is not intended to replace advice given to you by your health care provider. Make sure you discuss any questions you have with your health care provider.    Document Revised: 04/16/2021 Document Reviewed: 11/12/2020  [employer] Patient Education © 2021 [employer]

## 2025-01-16 ENCOUNTER — TELEPHONE (OUTPATIENT)
Dept: PAIN MEDICINE | Facility: HOSPITAL | Age: 75
End: 2025-01-16
Payer: MEDICARE

## 2025-03-10 ENCOUNTER — TELEPHONE (OUTPATIENT)
Dept: PAIN MEDICINE | Facility: CLINIC | Age: 75
End: 2025-03-10
Payer: MEDICARE

## 2025-03-10 NOTE — TELEPHONE ENCOUNTER
Caller: Desire Short    Relationship to patient: Self    Best call back number: [phone]    Type of visit: EST NEW PROV    Requested date: FIRST WEEK OF APRIL     If rescheduling, when is the original appointment: 03/12/25     Additional notes: PT IS HAVING ISSUES WITH MEDICAID INS, NEEDING SOME EXTRA TIME TO PROCESS. PLEASE CONTACT PT TO RESCHEDULE.

## 2025-05-05 NOTE — PROGRESS NOTES
Subjective   Desire Short is a 74 y.o. female is here for follow up for lower back pain. Previously patient of Dr. Camarena transferring care to me due to Dr. Monsivais's moving. New patient to me.  Last seen by Dr. Camarena on 9/9/2024. Excellent relief with RFA in lower back but some of her pain is returning now.     Hx- Previous patient of Dr. Monsivais's seen for chronic lower back pain with bilateral lumbar radiculopathy.  Before she transferred she was on low-dose Norco from her PCP but unfortunate was not providing pain relief.  MRI shows mild to moderate stenosis at multiple levels and described in imaging section.  She underwent lumbar RFA and bilateral knee injections with significant improvement in pain with Dr. Camarena.  She had undergone Orthovisc injection with orthopedics in 2020.    Lower back pain is 4/10 on VAS, at maximum is 5/10. Pain is sharp and stabbing in nature. Pain is referred right buttock, right hip. The pain is constant. The pain is improved by RFA. The pain is worse with riding in car, walking, standing. Using rollator to walk.     B/l knee pain 4/10 on VAS. Dull, achy pain. Worse with weight bearing. Improved with Zilretta injection.         Previous Injections:   1/15/2025; 7/17/2024; 10/4/2023-B/L L4-SA RFA-80% pain relief with functional improvement greater than 5-6 months.  11/20/2024; 8/7/2024 12/6/2023-B/L knee Zilretta injection-90% pain relief  4/12/2023-LESI L3-4-limited benefit.    PMH:   Hypertension, asthma    Current Medications:   Norco 7.5-325 mg solution  Flexeril 10 mg  Baclofen 10 mg  Celebrex 200 mg  Cymbalta 60 mg  Gabapentin 60 mg  Tramadol 50 mg TID PRN  Lyrica 150 mg BID       Past Medications:    Past Modalities:  TENS:       no          Physical Therapy Within The Last 6 Months     Yes- PT- [facility] - 4/25 - current   Psychotherapy     no  Massage Therapy      no    Patient Complains Of:  Uro-Fecal Incontinence no  Weight  Gain/Loss  no  Fever/Chills   no  Weakness   no      PEG Assessment   What number best describes your pain on average in the past week?4  What number best describes how, during the past week, pain has interfered with your enjoyment of life?5  What number best describes how, during the past week, pain has interfered with your general activity?  5    PHQ-9 Depression Screening  Little interest or pleasure in doing things? Not at all   Feeling down, depressed, or hopeless? Not at all   PHQ-2 Total Score 0   Trouble falling or staying asleep, or sleeping too much?     Feeling tired or having little energy?     Poor appetite or overeating?     Feeling bad about yourself - or that you are a failure or have let yourself or your family down?     Trouble concentrating on things, such as reading the newspaper or watching television?     Moving or speaking so slowly that other people could have noticed? Or the opposite - being so fidgety or restless that you have been moving around a lot more than usual?     Thoughts that you would be better off dead, or of hurting yourself in some way?     PHQ-9 Total Score     If you checked off any problems, how difficult have these problems made it for you to do your work, take care of things at home, or get along with other people? Not difficult at all        Patient screened positive for depression based on a PHQ-9 score of  on . Follow-up recommendations include: Suicide Risk Assessment performed.        Current Outpatient Medications:     albuterol sulfate  (90 Base) MCG/ACT inhaler, Inhale 2 puffs., Disp: , Rfl:     atorvastatin (LIPITOR) 20 MG tablet, Take 1 tablet by mouth Daily. 200001, Disp: , Rfl: 0    baclofen (LIORESAL) 10 MG tablet, Take 1 tablet by mouth 3 (Three) Times a Day., Disp: , Rfl:     Breo Ellipta 200-25 MCG/INH inhaler, Inhale 1 puff Daily., Disp: , Rfl:     busPIRone (BUSPAR) 5 MG tablet, Take 1 tablet by mouth 2 (Two) Times a Day., Disp: , Rfl:      celecoxib (CeleBREX) 200 MG capsule, CELEBREX 200 MG CAPS, Disp: , Rfl:     cetirizine (zyrTEC) 10 MG tablet, Take 1 tablet by mouth., Disp: , Rfl:     Cholecalciferol 1000 units tablet, Take 2 tablets by mouth Daily., Disp: , Rfl:     cyclobenzaprine (FLEXERIL) 10 MG tablet, TAKE 1 TABLET BY MOUTH THREE TIMES A DAY AS NEEDED FOR MUSCLE SPASM (FLEXERIL), Disp: , Rfl:     cyclobenzaprine (FLEXERIL) 10 MG tablet, Take 1 tablet by mouth., Disp: , Rfl:     DULoxetine (CYMBALTA) 60 MG capsule, TAKE 1 CAPSULE BY MOUTH ONE TIME A DAY, Disp: , Rfl:     ferrous sulfate 325 (65 FE) MG EC tablet, Take 1 tablet by mouth daily with breakfast Take on empty stomach, with something acidic., Disp: , Rfl:     gabapentin (NEURONTIN) 800 MG tablet, Take 1 tablet by mouth 3 (Three) Times a Day., Disp: , Rfl:     HYDROcodone-acetaminophen (HYCET) 7.5-325 MG/15ML solution, Take  by mouth Every 8 (Eight) Hours As Needed for Moderate Pain., Disp: , Rfl:     HYDROcodone-acetaminophen (HYCET) 7.5-325 MG/15ML solution, Take  by mouth Every 8 (Eight) Hours As Needed for Moderate Pain., Disp: , Rfl:     lisinopril-hydrochlorothiazide (PRINZIDE,ZESTORETIC) 20-25 MG per tablet, Take 1 tablet by mouth Daily., Disp: , Rfl:     methylPREDNISolone (MEDROL) 4 MG dose pack, Take as directed on package instructions., Disp: 21 tablet, Rfl: 0    pantoprazole (PROTONIX) 40 MG EC tablet, TAKE ONE TABLET BY MOUTH DAILY AT 9AM, Disp: , Rfl:     pramipexole (MIRAPEX) 0.25 MG tablet, TAKE 1/2 TABLET BY MOUTH AT BEDTIME FOR 1 WEEK THEN TAKE ONE TABLET BY MOUTH DAILY AT 9PM AT BEDTIME (vial), Disp: , Rfl:     pregabalin (LYRICA) 150 MG capsule, Take 1 capsule by mouth 2 (Two) Times a Day., Disp: 180 capsule, Rfl: 2    PROAIR  (90 Base) MCG/ACT inhaler, , Disp: , Rfl:     traMADol (ULTRAM) 50 MG tablet, Take 1 tablet by mouth Every 8 (Eight) Hours As Needed. for pain (Patient not taking: Reported on 5/6/2025), Disp: , Rfl:     Current Facility-Administered  Medications:     Hyaluronan (ORTHOVISC) injection 30 mg, 30 mg, Intra-articular, Weekly, Kaleb Teague MD, 30 mg at 20 1057    Hyaluronan (ORTHOVISC) injection 30 mg, 30 mg, Intra-articular, Weekly, Kaleb Teague MD, 30 mg at 20 1057    Hyaluronan (ORTHOVISC) injection 30 mg, 30 mg, Intra-articular, Weekly, Kaleb Teague MD, 30 mg at 10/01/20 1052    Hyaluronan (ORTHOVISC) injection 30 mg, 30 mg, Intra-articular, Weekly, Kaleb Teague MD, 30 mg at 10/01/20 1051    Hyaluronan (ORTHOVISC) injection 30 mg, 30 mg, Intra-articular, Weekly, Kaleb Teague MD, 30 mg at 10/08/20 1106    Hyaluronan (ORTHOVISC) injection 30 mg, 30 mg, Intra-articular, Weekly, Kaleb Teague MD, 30 mg at 10/08/20 1105    The following portions of the patient's history were reviewed and updated as appropriate: allergies, current medications, past family history, past medical history, past social history, past surgical history, and problem list.      REVIEW OF PERTINENT MEDICAL DATA    Past Medical History:   Diagnosis Date    Asthma     Chronic pain disorder     Depression     Endometriosis     Laproscopic surgery    Extremity pain     Fibromyalgia, primary 2013?    Hypertension     Joint pain     Low back pain     Lumbosacral disc disease     Osteoarthritis     Spinal stenosis      Past Surgical History:   Procedure Laterality Date    CATARACT EXTRACTION, BILATERAL Bilateral     EPIDURAL BLOCK       Family History   Problem Relation Age of Onset    Cancer Father             Alzheimer's disease Father     Arthritis Mother     Cancer Mother     Depression Mother     Osteoporosis Mother     COPD Brother     Migraines Sister      Social History     Socioeconomic History    Marital status:    Tobacco Use    Smoking status: Never    Smokeless tobacco: Never   Vaping Use    Vaping status: Never Used   Substance and Sexual Activity    Alcohol use: No    Drug use:  Never    Sexual activity: Not Currently     Partners: Male     Birth control/protection: Tubal ligation, Birth control pill         Review of Systems   Musculoskeletal:  Positive for arthralgias, back pain and joint swelling.         Vitals:    05/06/25 0920   BP: 149/77   Pulse: 74   Resp: 16   SpO2: 100%   Weight: 131 kg (289 lb)   PainSc: 4          Objective   Physical Exam  Musculoskeletal:         General: Tenderness present.        Legs:            Imaging Reviewed:  MRI lumbar spine without contrast-3/21/2023  - L1-2-mild facet changes  L2-3-mild facet changes  L3-4-mild disc bulge with mild to moderate facet arthropathy and mild bilateral neuroforaminal narrowing  L4-5-advanced facet arthropathy with ligamentum flavum hypertrophy.  Mild canal stenosis.  Mild to moderate bilateral inferior neuroforaminal stenosis  L5-S1-minor disc bulge.  Advanced bilateral facet arthropathy.  Mild to moderate left neuroforaminal stenosis.  - Grade 1 anterolisthesis of L4 on L5 and L5 on S1 due to facet arthropathy.      Assessment:    1. Lumbar spondylosis    2. Spinal stenosis at L4-L5 level    3. Primary osteoarthritis of both knees         Plan:   1.  Defer UDS for now.  2. We discussed trying a course of formal physical therapy.  Physical therapy can help strengthen and stretch the muscles around the joints. Continue to be as active as possible. Patient has done PT in past.   3. Reviewed Dr. Monsivais's notes, imaging and other physician's notes.  4.  Continue Lyrica 150 mg BID.   5. Excellent relief with b/l knee zirlretta injection lasting >3 months. Pain is returning. Recommend repeating b/l knee zilretta injections. Benefits and risks discussed.   6. Axial lower back pain with excellent improvement in pain with b/l L4-Sa RFA. Facet tenderness and facet loading positive b/l L3-Sa. Repeat b/l L4-Sa RFA with sedation after 7/15/25. Benefits and risks discussed with patient.       RTC for injection.     Philip Hung  DO  Pain Management   [facility]           INSPECT REPORT    As part of the patient's treatment plan, I may be prescribing controlled substances. The patient has been made aware of appropriate use of such medications, including potential risk of somnolence, limited ability to drive and/or work safely, and the potential for dependence or overdose. It has also been made clear that these medications are for use by this patient only, without concomitant use of alcohol or other substances unless prescribed.     Patient has completed prescribing agreement detailing terms of continued prescribing of controlled substances, including monitoring INSPECT reports, urine drug screening, and pill counts if necessary. The patient is aware that inappropriate use will results in cessation of prescribing such medications.    INSPECT report has been reviewed and scanned into the patient's chart.      EMR Dragon/Transcription Disclaimer:   Much of this encounter note is an electronic transcription/translation of spoken language to printed text. The electronic translation of spoken language may permit erroneous, or at times, nonsensical words or phrases to be inadvertently transcribed; Although I have reviewed the note for such errors, some may still exist.

## 2025-05-06 ENCOUNTER — OFFICE VISIT (OUTPATIENT)
Dept: PAIN MEDICINE | Facility: CLINIC | Age: 75
End: 2025-05-06
Payer: MEDICARE

## 2025-05-06 VITALS
WEIGHT: 289 LBS | SYSTOLIC BLOOD PRESSURE: 149 MMHG | OXYGEN SATURATION: 100 % | BODY MASS INDEX: 49.61 KG/M2 | DIASTOLIC BLOOD PRESSURE: 77 MMHG | HEART RATE: 74 BPM | RESPIRATION RATE: 16 BRPM

## 2025-05-06 DIAGNOSIS — M47.816 LUMBAR SPONDYLOSIS: Primary | ICD-10-CM

## 2025-05-06 DIAGNOSIS — M48.061 SPINAL STENOSIS AT L4-L5 LEVEL: ICD-10-CM

## 2025-05-06 DIAGNOSIS — M17.0 PRIMARY OSTEOARTHRITIS OF BOTH KNEES: ICD-10-CM

## 2025-05-07 DIAGNOSIS — M17.0 PRIMARY OSTEOARTHRITIS OF BOTH KNEES: Primary | ICD-10-CM

## 2025-05-27 ENCOUNTER — HOSPITAL ENCOUNTER (OUTPATIENT)
Dept: PAIN MEDICINE | Facility: HOSPITAL | Age: 75
Discharge: HOME | End: 2025-05-27
Payer: MEDICARE

## 2025-05-27 VITALS
RESPIRATION RATE: 16 BRPM | WEIGHT: 289 LBS | DIASTOLIC BLOOD PRESSURE: 67 MMHG | OXYGEN SATURATION: 100 % | HEART RATE: 71 BPM | BODY MASS INDEX: 49.34 KG/M2 | TEMPERATURE: 96.8 F | SYSTOLIC BLOOD PRESSURE: 147 MMHG | HEIGHT: 64 IN

## 2025-05-27 DIAGNOSIS — M17.0 PRIMARY OSTEOARTHRITIS OF BOTH KNEES: Primary | ICD-10-CM

## 2025-05-27 DIAGNOSIS — R52 PAIN: ICD-10-CM

## 2025-05-27 PROCEDURE — 25010000002 BUPIVACAINE (PF) 0.25 % SOLUTION: Performed by: STUDENT IN AN ORGANIZED HEALTH CARE EDUCATION/TRAINING PROGRAM

## 2025-05-27 PROCEDURE — 25010000002 LIDOCAINE PF 1% 1 % SOLUTION: Performed by: STUDENT IN AN ORGANIZED HEALTH CARE EDUCATION/TRAINING PROGRAM

## 2025-05-27 PROCEDURE — 25010000002 TRIAMCINOLONE ACETONIDE 32 MG SUSPENSION RECONSTITUTED ER: Performed by: STUDENT IN AN ORGANIZED HEALTH CARE EDUCATION/TRAINING PROGRAM

## 2025-05-27 PROCEDURE — 76942 ECHO GUIDE FOR BIOPSY: CPT

## 2025-05-27 PROCEDURE — 20611 DRAIN/INJ JOINT/BURSA W/US: CPT | Performed by: STUDENT IN AN ORGANIZED HEALTH CARE EDUCATION/TRAINING PROGRAM

## 2025-05-27 PROCEDURE — 25010000002 TRIAMCINOLONE ACETONIDE 32 MG SUSPENSION RECONSTITUTED ER

## 2025-05-27 RX ORDER — LIDOCAINE HYDROCHLORIDE 10 MG/ML
5 INJECTION, SOLUTION EPIDURAL; INFILTRATION; INTRACAUDAL; PERINEURAL ONCE
Status: COMPLETED | OUTPATIENT
Start: 2025-05-27 | End: 2025-05-27

## 2025-05-27 RX ORDER — BUPIVACAINE HYDROCHLORIDE 2.5 MG/ML
10 INJECTION, SOLUTION EPIDURAL; INFILTRATION; INTRACAUDAL; PERINEURAL ONCE
Status: COMPLETED | OUTPATIENT
Start: 2025-05-27 | End: 2025-05-27

## 2025-05-27 RX ADMIN — TRIAMCINOLONE ACETONIDE EXTENDED-RELEASE INJECTABLE SUSPENSION 32 MG: KIT INTRA-ARTICULAR at 14:14

## 2025-05-27 RX ADMIN — TRIAMCINOLONE ACETONIDE EXTENDED-RELEASE INJECTABLE SUSPENSION 32 MG: KIT INTRA-ARTICULAR at 14:18

## 2025-05-27 RX ADMIN — LIDOCAINE HYDROCHLORIDE 5 ML: 10 INJECTION, SOLUTION EPIDURAL; INFILTRATION; INTRACAUDAL; PERINEURAL at 14:11

## 2025-05-27 RX ADMIN — BUPIVACAINE HYDROCHLORIDE 6 ML: 2.5 INJECTION, SOLUTION EPIDURAL; INFILTRATION; INTRACAUDAL; PERINEURAL at 14:14

## 2025-05-27 NOTE — PROCEDURES
POSTOPERATIVE DIAGNOSIS:  Same    PROCEDURE Bilateral Ultrasound guided intraarticular knee Zilretta injection    PROCEDURE NOTE:  After obtaining written informed consent patient was taken to the procedure room. Pre-procedure blood pressure and pulse were stable and recorded in patients clinic chart.     The patient was placed in a supine position and the RIGHT knee was slightly flexed with pillow under, it was prepped with chloraprep and draped in the usual sterile fashion. US was used to identify prepatellar bursa.  The skin was infilterated with 1% lidoacaine using 25 g needle. 20-gauge-1.5-inch needle was inserted into bursa from lateral side under US guided. 10 cc synovial fluid was aspirated.   Following the negative aspiration, mixture of 32 mg Zilretta with 2 ml of NS, 3 ml of 0.25% bupivacaine was injected without any complications.     Similar procedure was repeated on left knee.     After the procedure the needle was removed and sterile band-aid was placed.    Following the procedure the patient's vital signs were stable. The patient was discharged home in good condition after being given discharge instructions.      COMPLICATIONS: None    Philip Hung DO  Pain Management   [facility]

## 2025-05-27 NOTE — H&P
H and P reviewed from previous visit and no changes to patient's clinical presentation. Will proceed with procedure as planned.     Philip Hung DO  Pain Management   [facility]

## 2025-05-28 ENCOUNTER — TELEPHONE (OUTPATIENT)
Dept: PAIN MEDICINE | Facility: HOSPITAL | Age: 75
End: 2025-05-28
Payer: MEDICARE

## 2025-06-25 DIAGNOSIS — M47.816 LUMBAR SPONDYLOSIS: Primary | ICD-10-CM

## 2025-07-21 ENCOUNTER — TELEPHONE (OUTPATIENT)
Dept: PAIN MEDICINE | Facility: HOSPITAL | Age: 75
End: 2025-07-21
Payer: MEDICARE

## 2025-07-22 ENCOUNTER — HOSPITAL ENCOUNTER (OUTPATIENT)
Dept: PAIN MEDICINE | Facility: HOSPITAL | Age: 75
Discharge: HOME | End: 2025-07-22
Payer: MEDICARE

## 2025-07-22 VITALS
OXYGEN SATURATION: 98 % | BODY MASS INDEX: 49.61 KG/M2 | TEMPERATURE: 97.3 F | RESPIRATION RATE: 18 BRPM | HEIGHT: 64 IN | SYSTOLIC BLOOD PRESSURE: 137 MMHG | HEART RATE: 73 BPM | DIASTOLIC BLOOD PRESSURE: 62 MMHG

## 2025-07-22 DIAGNOSIS — M47.816 LUMBAR SPONDYLOSIS: Primary | ICD-10-CM

## 2025-07-22 DIAGNOSIS — R52 PAIN: ICD-10-CM

## 2025-07-22 PROCEDURE — 64636 DESTROY L/S FACET JNT ADDL: CPT | Performed by: STUDENT IN AN ORGANIZED HEALTH CARE EDUCATION/TRAINING PROGRAM

## 2025-07-22 PROCEDURE — 25010000002 METHYLPREDNISOLONE PER 40 MG: Performed by: STUDENT IN AN ORGANIZED HEALTH CARE EDUCATION/TRAINING PROGRAM

## 2025-07-22 PROCEDURE — 25010000002 LIDOCAINE PF 1% 1 % SOLUTION: Performed by: STUDENT IN AN ORGANIZED HEALTH CARE EDUCATION/TRAINING PROGRAM

## 2025-07-22 PROCEDURE — 25010000002 MIDAZOLAM PER 1 MG: Performed by: STUDENT IN AN ORGANIZED HEALTH CARE EDUCATION/TRAINING PROGRAM

## 2025-07-22 PROCEDURE — 77003 FLUOROGUIDE FOR SPINE INJECT: CPT

## 2025-07-22 PROCEDURE — 64635 DESTROY LUMB/SAC FACET JNT: CPT | Performed by: STUDENT IN AN ORGANIZED HEALTH CARE EDUCATION/TRAINING PROGRAM

## 2025-07-22 PROCEDURE — 25010000002 FENTANYL CITRATE (PF) 50 MCG/ML SOLUTION: Performed by: STUDENT IN AN ORGANIZED HEALTH CARE EDUCATION/TRAINING PROGRAM

## 2025-07-22 PROCEDURE — 99152 MOD SED SAME PHYS/QHP 5/>YRS: CPT | Performed by: STUDENT IN AN ORGANIZED HEALTH CARE EDUCATION/TRAINING PROGRAM

## 2025-07-22 PROCEDURE — 25010000002 LIDOCAINE 2% SOLUTION: Performed by: STUDENT IN AN ORGANIZED HEALTH CARE EDUCATION/TRAINING PROGRAM

## 2025-07-22 RX ORDER — LIDOCAINE HYDROCHLORIDE 20 MG/ML
10 INJECTION, SOLUTION INFILTRATION; PERINEURAL ONCE
Status: COMPLETED | OUTPATIENT
Start: 2025-07-22 | End: 2025-07-22

## 2025-07-22 RX ORDER — FENTANYL CITRATE 50 UG/ML
100 INJECTION, SOLUTION INTRAMUSCULAR; INTRAVENOUS ONCE
Status: COMPLETED | OUTPATIENT
Start: 2025-07-22 | End: 2025-07-22

## 2025-07-22 RX ORDER — LIDOCAINE HYDROCHLORIDE 10 MG/ML
5 INJECTION, SOLUTION EPIDURAL; INFILTRATION; INTRACAUDAL; PERINEURAL ONCE
Status: COMPLETED | OUTPATIENT
Start: 2025-07-22 | End: 2025-07-22

## 2025-07-22 RX ORDER — MIDAZOLAM HYDROCHLORIDE 1 MG/ML
2 INJECTION, SOLUTION INTRAMUSCULAR; INTRAVENOUS ONCE
Status: COMPLETED | OUTPATIENT
Start: 2025-07-22 | End: 2025-07-22

## 2025-07-22 RX ORDER — METHYLPREDNISOLONE ACETATE 40 MG/ML
40 INJECTION, SUSPENSION INTRA-ARTICULAR; INTRALESIONAL; INTRAMUSCULAR; SOFT TISSUE ONCE
Status: COMPLETED | OUTPATIENT
Start: 2025-07-22 | End: 2025-07-22

## 2025-07-22 RX ADMIN — FENTANYL CITRATE 100 MCG: 50 INJECTION, SOLUTION INTRAMUSCULAR; INTRAVENOUS at 13:01

## 2025-07-22 RX ADMIN — MIDAZOLAM 1 MG: 1 INJECTION INTRAMUSCULAR; INTRAVENOUS at 13:01

## 2025-07-22 RX ADMIN — LIDOCAINE HYDROCHLORIDE 5 ML: 10 INJECTION, SOLUTION EPIDURAL; INFILTRATION; INTRACAUDAL; PERINEURAL at 13:04

## 2025-07-22 RX ADMIN — METHYLPREDNISOLONE ACETATE 40 MG: 40 INJECTION, SUSPENSION INTRA-ARTICULAR; INTRALESIONAL; INTRAMUSCULAR; INTRASYNOVIAL; SOFT TISSUE at 13:17

## 2025-07-22 RX ADMIN — LIDOCAINE HYDROCHLORIDE 10 ML: 20 INJECTION, SOLUTION INFILTRATION; PERINEURAL at 13:15

## 2025-07-22 NOTE — PROCEDURES
Preoperative Diagnosis:    Lumbar Spondylosis/ Sacroiliac Joint Dysfunction                                              Postoperative Diagnosis: SAME     PROCEDURE:  Radiofrequency Ablation (RFA) of Lumbar Medial Branch at Bilateral   L4, 5, sacro ala      NOTE:  After obtaining written informed consent patient was taken to the procedure room. Pre-procedure blood pressure and pulse were stable and recorded in patients clinic chart. The patient was placed in a prone position and right lumbar area was prepped with chloraprep times two and draped in the usual sterile fashion.  The skin over the right L 4 transverse process with vertebral body was infiltrated with 1% lidocaine for local anesthesia.  A 20-gauge  mm needle with 5 mm active was inserted into the right L 4 medial branch under radiographic guidance. Both AP and lateral views were obtained. The identical procedure was performed on right L 5 and SA medial branch. Following placement of the needle sensory stimulation at 50 Hz and motor stimulation at 2 Hz was carried out. 1 cc of 2% lidocaine was injected.   Following placement of the needle sensory stimulation at 50 Hz and motor stimulation at 2 Hz was carried out. 1 cc of 2% lidocaine was injected. The RFA was carried out in lesion mode after patient reporting reproduction of pain or sensation in the area of symptoms and denying extremity motor sensations. The settings were 80°C, and 90 seconds. During the procedure no pain was reported in the extremities by the patient.      Similar procedure was repeated at Left L4, L5 and Sa.     After the procedure the needles were removed. Skin was cleaned and a sterile dressing was applied. Following the procedure the patient's vital signs were stable. The patient was discharged.     COMPLICATIONS: None     RFA DATA: In chart    Moderate sedation:   Start time: 1301  End time: 1330    100 mcg Fentanly; 1 mg Versed ; 15 minutes of moderate sedation was done and  vitals were monitored closely during this periods.  ETCO2, RR, BP, HR, O2 were monitored closely throughout sedation period.        Philip Hung DO  Pain Management   [facility]

## 2025-07-22 NOTE — H&P
H and P reviewed from previous visit and no changes to patient's clinical presentation. Will proceed with procedure as planned.     Philip Hung DO  Pain Management   [facility]

## 2025-07-22 NOTE — DISCHARGE INSTRUCTIONS
Radiofrequency Lesioning, Care After    Refer to this sheet in the next few weeks. These instructions provide you with information about caring for yourself after your procedure. Your health care provider may also give you more specific instructions. Your treatment has been planned according to current medical practices, but problems sometimes occur. Call your health care provider if you have any problems or questions after your procedure.  What can I expect after the procedure?  After the procedure, it is common to have:  Pain from the burned nerve.  You may feel a burning sensation for up to 1-2 weeks after the procedure  Temporary numbness at the site  Your leg/legs may be weak or feel numb after the procedure until the numbing medication wears off.  When you are up and walking, have assistance to prevent falling.     Follow these instructions at home:  Take over-the-counter and prescription medicines only as told by your health care provider.  Return to your normal activities as told by your health care provider. Ask your health care provider what activities are safe for you.  Pay close attention to how you feel after the procedure. If you start to have pain, write down when it hurts and how it feels. This will help you and your health care provider to know if you need an additional treatment.  Check your needle insertion site every day for signs of infection. Watch for:  Redness, swelling, or pain.  Fluid, blood, or pus.  Keep all follow-up visits as told by your health care provider. This is important.  No driving for 24 hrs-make sure you have full sensation in your legs prior to driving.  Avoid using heat on the injection site for 24 hours. You may use ice intermittently if needed by placing a               towel between your skin and the ice bag and using the ice for 20 minutes 2-3 times a day.  Do not take baths, swim or use a hot tub for 24 hours.  Leave your band-aids on for 24 hours and keep them  dry.    Contact a health care provider if:  Your pain does not get better.  You have redness, swelling, or pain at the needle insertion site.  You have fluid, blood, or pus coming from the needle insertion site.  You have a fever over 101 degrees.  You have new numb.ness in your arm or leg after the procedure    Get help right away if:  You develop sudden, severe pain.  You develop numbness or tingling near the procedure site that does not go away.  This information is not intended to replace advice given to you by your health care provider. Make sure you discuss any questions you have with your health care provider.  Document Released: 08/16/2012 Document Revised: 05/25/2017 Document Reviewed: 01/25/2016  [employer] Interactive Patient Education © 2019 [employer]  Moderate Conscious Sedation, Adult, Care After    This sheet gives you information about how to care for yourself after your procedure. Your health care provider may also give you more specific instructions. If you have problems or questions, contact your health care provider.    What can I expect after the procedure?  After the procedure, it is common to have:  Sleepiness for several hours.  Impaired judgment for several hours.  Difficulty with balance.  Vomiting if you eat too soon.    Follow these instructions at home:  For the next 24 hours:         Rest.  Do not participate in activities where you could fall or become injured.  Do not drive or use machinery.  Do not drink alcohol.  Do not take sleeping pills or medicines that cause drowsiness.  Do not make important decisions or sign legal documents.  Do not take care of children on your own.    Eating and drinking    Follow the diet recommended by your health care provider.  Drink enough fluid to keep your urine pale yellow.  If you vomit:  Drink water, juice, or soup when you can drink without vomiting.  Make sure you have little or no nausea before eating solid foods.     General  instructions  Take over-the-counter and prescription medicines only as told by your health care provider.  Have a responsible adult stay with you for 24 hours. It is important to have someone help care for you until you are awake and alert.  Do not smoke.  Keep all follow-up visits as told by your health care provider. This is important.    Contact a health care provider if:  You are still sleepy or having trouble with balance after 24 hours.  You feel light-headed.  You keep feeling nauseous or you keep vomiting.  You develop a rash.  You have a fever.  You have redness or swelling around the IV site.    Get help right away if:  You have trouble breathing.  You have new-onset confusion at home.    Summary  After the procedure, it is common to feel sleepy, have impaired judgment, or feel nauseous if you eat too soon.  Rest after you get home. Know the things you should not do after the procedure.  Follow the diet recommended by your health care provider and drink enough fluid to keep your urine pale yellow.  Get help right away if you have trouble breathing or new-onset confusion at home.    This information is not intended to replace advice given to you by your health care provider. Make sure you discuss any questions you have with your health care provider.    Document Revised: 04/16/2021 Document Reviewed: 11/12/2020  [employer] Patient Education © 2021 [employer]

## 2025-07-23 ENCOUNTER — TELEPHONE (OUTPATIENT)
Dept: PAIN MEDICINE | Facility: HOSPITAL | Age: 75
End: 2025-07-23
Payer: MEDICARE

## 2025-08-21 ENCOUNTER — OFFICE VISIT (OUTPATIENT)
Dept: PAIN MEDICINE | Facility: CLINIC | Age: 75
End: 2025-08-21
Payer: MEDICARE

## 2025-08-21 VITALS
RESPIRATION RATE: 16 BRPM | HEART RATE: 99 BPM | OXYGEN SATURATION: 95 % | WEIGHT: 289 LBS | DIASTOLIC BLOOD PRESSURE: 66 MMHG | BODY MASS INDEX: 49.61 KG/M2 | SYSTOLIC BLOOD PRESSURE: 151 MMHG

## 2025-08-21 DIAGNOSIS — M54.16 LUMBAR RADICULOPATHY: ICD-10-CM

## 2025-08-21 DIAGNOSIS — M47.816 LUMBAR SPONDYLOSIS: Primary | ICD-10-CM

## 2025-08-21 DIAGNOSIS — M17.0 PRIMARY OSTEOARTHRITIS OF BOTH KNEES: ICD-10-CM

## 2025-08-21 DIAGNOSIS — M48.061 SPINAL STENOSIS AT L4-L5 LEVEL: ICD-10-CM

## 2025-08-21 DIAGNOSIS — M53.3 SACROILIAC JOINT DYSFUNCTION: ICD-10-CM
